# Patient Record
Sex: MALE | Race: WHITE | NOT HISPANIC OR LATINO | URBAN - METROPOLITAN AREA
[De-identification: names, ages, dates, MRNs, and addresses within clinical notes are randomized per-mention and may not be internally consistent; named-entity substitution may affect disease eponyms.]

---

## 2017-05-24 ENCOUNTER — INPATIENT (INPATIENT)
Facility: HOSPITAL | Age: 82
LOS: 1 days | Discharge: HOME | DRG: 287 | End: 2017-05-26
Attending: INTERNAL MEDICINE | Admitting: INTERNAL MEDICINE
Payer: MEDICARE

## 2017-05-24 VITALS
RESPIRATION RATE: 18 BRPM | HEIGHT: 65 IN | DIASTOLIC BLOOD PRESSURE: 65 MMHG | OXYGEN SATURATION: 95 % | HEART RATE: 62 BPM | WEIGHT: 169.98 LBS | TEMPERATURE: 98 F | SYSTOLIC BLOOD PRESSURE: 181 MMHG

## 2017-05-24 LAB
ALBUMIN SERPL ELPH-MCNC: 4 G/DL — SIGNIFICANT CHANGE UP (ref 3.3–5)
ALP SERPL-CCNC: 91 U/L — SIGNIFICANT CHANGE UP (ref 40–120)
ALT FLD-CCNC: 15 U/L — SIGNIFICANT CHANGE UP (ref 10–45)
ANION GAP SERPL CALC-SCNC: 13 MMOL/L — SIGNIFICANT CHANGE UP (ref 5–17)
APTT BLD: 28.6 SEC — SIGNIFICANT CHANGE UP (ref 27.5–37.4)
AST SERPL-CCNC: 20 U/L — SIGNIFICANT CHANGE UP (ref 10–40)
BASOPHILS NFR BLD AUTO: 0.3 % — SIGNIFICANT CHANGE UP (ref 0–2)
BILIRUB SERPL-MCNC: 0.4 MG/DL — SIGNIFICANT CHANGE UP (ref 0.2–1.2)
BUN SERPL-MCNC: 21 MG/DL — SIGNIFICANT CHANGE UP (ref 7–23)
CALCIUM SERPL-MCNC: 9.2 MG/DL — SIGNIFICANT CHANGE UP (ref 8.4–10.5)
CHLORIDE SERPL-SCNC: 105 MMOL/L — SIGNIFICANT CHANGE UP (ref 96–108)
CK MB CFR SERPL CALC: 3.4 NG/ML — SIGNIFICANT CHANGE UP (ref 0–6.7)
CK SERPL-CCNC: 92 U/L — SIGNIFICANT CHANGE UP (ref 30–200)
CO2 SERPL-SCNC: 23 MMOL/L — SIGNIFICANT CHANGE UP (ref 22–31)
CREAT SERPL-MCNC: 1.2 MG/DL — SIGNIFICANT CHANGE UP (ref 0.5–1.3)
EOSINOPHIL NFR BLD AUTO: 2.7 % — SIGNIFICANT CHANGE UP (ref 0–6)
GLUCOSE SERPL-MCNC: 115 MG/DL — HIGH (ref 70–99)
HCT VFR BLD CALC: 39.1 % — SIGNIFICANT CHANGE UP (ref 39–50)
HGB BLD-MCNC: 13.5 G/DL — SIGNIFICANT CHANGE UP (ref 13–17)
INR BLD: 1.1 — SIGNIFICANT CHANGE UP (ref 0.88–1.16)
LYMPHOCYTES # BLD AUTO: 18.1 % — SIGNIFICANT CHANGE UP (ref 13–44)
MCHC RBC-ENTMCNC: 29.9 PG — SIGNIFICANT CHANGE UP (ref 27–34)
MCHC RBC-ENTMCNC: 34.5 G/DL — SIGNIFICANT CHANGE UP (ref 32–36)
MCV RBC AUTO: 86.7 FL — SIGNIFICANT CHANGE UP (ref 80–100)
MONOCYTES NFR BLD AUTO: 7.7 % — SIGNIFICANT CHANGE UP (ref 2–14)
NEUTROPHILS NFR BLD AUTO: 71.2 % — SIGNIFICANT CHANGE UP (ref 43–77)
PLATELET # BLD AUTO: 246 K/UL — SIGNIFICANT CHANGE UP (ref 150–400)
POTASSIUM SERPL-MCNC: 4.5 MMOL/L — SIGNIFICANT CHANGE UP (ref 3.5–5.3)
POTASSIUM SERPL-SCNC: 4.5 MMOL/L — SIGNIFICANT CHANGE UP (ref 3.5–5.3)
PROT SERPL-MCNC: 6.8 G/DL — SIGNIFICANT CHANGE UP (ref 6–8.3)
PROTHROM AB SERPL-ACNC: 12.2 SEC — SIGNIFICANT CHANGE UP (ref 9.8–12.7)
RBC # BLD: 4.51 M/UL — SIGNIFICANT CHANGE UP (ref 4.2–5.8)
RBC # FLD: 12.9 % — SIGNIFICANT CHANGE UP (ref 10.3–16.9)
SODIUM SERPL-SCNC: 141 MMOL/L — SIGNIFICANT CHANGE UP (ref 135–145)
TROPONIN T SERPL-MCNC: <0.01 NG/ML — SIGNIFICANT CHANGE UP (ref 0–0.01)
WBC # BLD: 7.8 K/UL — SIGNIFICANT CHANGE UP (ref 3.8–10.5)
WBC # FLD AUTO: 7.8 K/UL — SIGNIFICANT CHANGE UP (ref 3.8–10.5)

## 2017-05-24 PROCEDURE — 71010: CPT | Mod: 26

## 2017-05-24 PROCEDURE — 93010 ELECTROCARDIOGRAM REPORT: CPT | Mod: 76

## 2017-05-24 PROCEDURE — 99285 EMERGENCY DEPT VISIT HI MDM: CPT | Mod: 25

## 2017-05-24 RX ORDER — ASPIRIN/CALCIUM CARB/MAGNESIUM 324 MG
81 TABLET ORAL DAILY
Qty: 0 | Refills: 0 | Status: DISCONTINUED | OUTPATIENT
Start: 2017-05-24 | End: 2017-05-26

## 2017-05-24 RX ORDER — ASPIRIN/CALCIUM CARB/MAGNESIUM 324 MG
325 TABLET ORAL ONCE
Qty: 0 | Refills: 0 | Status: COMPLETED | OUTPATIENT
Start: 2017-05-24 | End: 2017-05-24

## 2017-05-24 RX ORDER — LISINOPRIL 2.5 MG/1
40 TABLET ORAL DAILY
Qty: 0 | Refills: 0 | Status: DISCONTINUED | OUTPATIENT
Start: 2017-05-24 | End: 2017-05-25

## 2017-05-24 RX ORDER — CLOPIDOGREL BISULFATE 75 MG/1
75 TABLET, FILM COATED ORAL DAILY
Qty: 0 | Refills: 0 | Status: DISCONTINUED | OUTPATIENT
Start: 2017-05-24 | End: 2017-05-25

## 2017-05-24 RX ORDER — RANOLAZINE 500 MG/1
500 TABLET, FILM COATED, EXTENDED RELEASE ORAL
Qty: 0 | Refills: 0 | Status: DISCONTINUED | OUTPATIENT
Start: 2017-05-24 | End: 2017-05-26

## 2017-05-24 RX ORDER — AMLODIPINE BESYLATE 2.5 MG/1
10 TABLET ORAL DAILY
Qty: 0 | Refills: 0 | Status: DISCONTINUED | OUTPATIENT
Start: 2017-05-24 | End: 2017-05-25

## 2017-05-24 RX ORDER — ATENOLOL 25 MG/1
25 TABLET ORAL DAILY
Qty: 0 | Refills: 0 | Status: DISCONTINUED | OUTPATIENT
Start: 2017-05-24 | End: 2017-05-25

## 2017-05-24 RX ORDER — PANTOPRAZOLE SODIUM 20 MG/1
40 TABLET, DELAYED RELEASE ORAL
Qty: 0 | Refills: 0 | Status: DISCONTINUED | OUTPATIENT
Start: 2017-05-24 | End: 2017-05-26

## 2017-05-24 RX ORDER — ATORVASTATIN CALCIUM 80 MG/1
20 TABLET, FILM COATED ORAL AT BEDTIME
Qty: 0 | Refills: 0 | Status: DISCONTINUED | OUTPATIENT
Start: 2017-05-24 | End: 2017-05-26

## 2017-05-24 RX ADMIN — ATORVASTATIN CALCIUM 20 MILLIGRAM(S): 80 TABLET, FILM COATED ORAL at 22:41

## 2017-05-24 RX ADMIN — Medication 325 MILLIGRAM(S): at 18:23

## 2017-05-24 NOTE — H&P ADULT - HISTORY OF PRESENT ILLNESS
84 y/o male former smoker, PMHx HTN, HLD, prostate ca s/p radiation, CAD last PCI 2016 NARESH OM1 & PTCA midLAD, presents to [facility] ED referred from cardiologist's office for evaluation of epigastric discomfort. Pt states he has been experiencing epigastric burning discomfort unrelated to activity associated with lightheadedness. Pt was scheduled for a NST as outpatient but did not yet make appointment. Pt reports he had an in-office TTE today which he states was normal. In ED, Troponin negative x1, EKG SB 58 bpm no ischemic changes, CXR unremarkable. Pt now admitted for telemetry, serial CE, and plan for NST.

## 2017-05-24 NOTE — ED PROVIDER NOTE - MEDICAL DECISION MAKING DETAILS
85 yo male with CAD HTN HLD with 4-5 dday hx of fatigue and CP  inc RBB  no SARANYA  await trop  will cath  no active pain dw dr espinoza 83 yo male with CAD HTN HLD with 4-5 day hx of fatigue and CP  inc RBB  no SARANYA  await trop  will cath  no active pain dw dr espinoza admit for cath in am

## 2017-05-24 NOTE — PATIENT PROFILE ADULT. - VISION (WITH CORRECTIVE LENSES IF THE PATIENT USUALLY WEARS THEM):
R eye vision impairment/Partially impaired: cannot see medication labels or newsprint, but can see obstacles in path, and the surrounding layout; can count fingers at arm's length

## 2017-05-24 NOTE — ED ADULT NURSE NOTE - OBJECTIVE STATEMENT
85 y/o male w/ hx of 6 stents c/o 6/10 chest pain x2 days. Patient A+Ox3, ambulating well, skin intact. Patient reporting decrease in pain since arrival. Patient denies SOB, fevers, n/v/d.

## 2017-05-24 NOTE — ED PROVIDER NOTE - OBJECTIVE STATEMENT
85 yo male with hx of CAD HTN HLD  with 3-4 day hx of CP  mid sternal radiating to neck with inceased fatigue and dec exercise tolerance over the past 2 weekas  was seen in office by pmd at [facility]- referred to ED for further eval  no active CP or sob  no leg swelling is compliant with ASA and plavix

## 2017-05-24 NOTE — ED ADULT TRIAGE NOTE - CHIEF COMPLAINT QUOTE
Pt directed to ED from Cardiologist office CO CP 6/10 x2 days.  Pt states "I was with my doctor and he told me to come in."  Pt denies N/V/D, Dizziness, SOB, and Fevers

## 2017-05-25 DIAGNOSIS — R07.9 CHEST PAIN, UNSPECIFIED: ICD-10-CM

## 2017-05-25 DIAGNOSIS — I25.10 ATHEROSCLEROTIC HEART DISEASE OF NATIVE CORONARY ARTERY WITHOUT ANGINA PECTORIS: ICD-10-CM

## 2017-05-25 DIAGNOSIS — Z02.9 ENCOUNTER FOR ADMINISTRATIVE EXAMINATIONS, UNSPECIFIED: ICD-10-CM

## 2017-05-25 DIAGNOSIS — E78.5 HYPERLIPIDEMIA, UNSPECIFIED: ICD-10-CM

## 2017-05-25 DIAGNOSIS — I10 ESSENTIAL (PRIMARY) HYPERTENSION: ICD-10-CM

## 2017-05-25 DIAGNOSIS — Z29.9 ENCOUNTER FOR PROPHYLACTIC MEASURES, UNSPECIFIED: ICD-10-CM

## 2017-05-25 LAB
ANION GAP SERPL CALC-SCNC: 10 MMOL/L — SIGNIFICANT CHANGE UP (ref 5–17)
APTT BLD: 30.3 SEC — SIGNIFICANT CHANGE UP (ref 27.5–37.4)
BUN SERPL-MCNC: 18 MG/DL — SIGNIFICANT CHANGE UP (ref 7–23)
CALCIUM SERPL-MCNC: 9.7 MG/DL — SIGNIFICANT CHANGE UP (ref 8.4–10.5)
CHLORIDE SERPL-SCNC: 104 MMOL/L — SIGNIFICANT CHANGE UP (ref 96–108)
CHOLEST SERPL-MCNC: 115 MG/DL — SIGNIFICANT CHANGE UP (ref 10–199)
CK SERPL-CCNC: 111 U/L — SIGNIFICANT CHANGE UP (ref 30–200)
CO2 SERPL-SCNC: 25 MMOL/L — SIGNIFICANT CHANGE UP (ref 22–31)
CREAT SERPL-MCNC: 1.1 MG/DL — SIGNIFICANT CHANGE UP (ref 0.5–1.3)
GLUCOSE SERPL-MCNC: 91 MG/DL — SIGNIFICANT CHANGE UP (ref 70–99)
HBA1C BLD-MCNC: 5.2 % — SIGNIFICANT CHANGE UP (ref 4–5.6)
HBA1C BLD-MCNC: 5.5 % — SIGNIFICANT CHANGE UP (ref 4–5.6)
HCT VFR BLD CALC: 37.7 % — LOW (ref 39–50)
HDLC SERPL-MCNC: 44 MG/DL — SIGNIFICANT CHANGE UP (ref 40–125)
HGB BLD-MCNC: 13.2 G/DL — SIGNIFICANT CHANGE UP (ref 13–17)
INR BLD: 1.17 — HIGH (ref 0.88–1.16)
LIPID PNL WITH DIRECT LDL SERPL: 62 MG/DL — SIGNIFICANT CHANGE UP
MAGNESIUM SERPL-MCNC: 1.9 MG/DL — SIGNIFICANT CHANGE UP (ref 1.6–2.6)
MCHC RBC-ENTMCNC: 30.3 PG — SIGNIFICANT CHANGE UP (ref 27–34)
MCHC RBC-ENTMCNC: 35 G/DL — SIGNIFICANT CHANGE UP (ref 32–36)
MCV RBC AUTO: 86.5 FL — SIGNIFICANT CHANGE UP (ref 80–100)
PLATELET # BLD AUTO: 216 K/UL — SIGNIFICANT CHANGE UP (ref 150–400)
POTASSIUM SERPL-MCNC: 3.9 MMOL/L — SIGNIFICANT CHANGE UP (ref 3.5–5.3)
POTASSIUM SERPL-SCNC: 3.9 MMOL/L — SIGNIFICANT CHANGE UP (ref 3.5–5.3)
PROTHROM AB SERPL-ACNC: 13 SEC — HIGH (ref 9.8–12.7)
RBC # BLD: 4.36 M/UL — SIGNIFICANT CHANGE UP (ref 4.2–5.8)
RBC # FLD: 13.1 % — SIGNIFICANT CHANGE UP (ref 10.3–16.9)
SODIUM SERPL-SCNC: 139 MMOL/L — SIGNIFICANT CHANGE UP (ref 135–145)
T3FREE SERPL-MCNC: 2.6 PG/ML — SIGNIFICANT CHANGE UP (ref 1.71–3.71)
T4 FREE SERPL-MCNC: 1.09 NG/DL — SIGNIFICANT CHANGE UP (ref 0.7–1.48)
TOTAL CHOLESTEROL/HDL RATIO MEASUREMENT: 2.6 RATIO — LOW (ref 3.4–9.6)
TRIGL SERPL-MCNC: 45 MG/DL — SIGNIFICANT CHANGE UP (ref 10–149)
TROPONIN T SERPL-MCNC: <0.01 NG/ML — SIGNIFICANT CHANGE UP (ref 0–0.01)
TSH SERPL-MCNC: 0.2 UIU/ML — LOW (ref 0.35–4.94)
WBC # BLD: 7.1 K/UL — SIGNIFICANT CHANGE UP (ref 3.8–10.5)
WBC # FLD AUTO: 7.1 K/UL — SIGNIFICANT CHANGE UP (ref 3.8–10.5)

## 2017-05-25 PROCEDURE — 93572 IV DOP VEL&/PRESS C FLO EA: CPT | Mod: 26,LC

## 2017-05-25 PROCEDURE — 94010 BREATHING CAPACITY TEST: CPT | Mod: 26

## 2017-05-25 PROCEDURE — 93571 IV DOP VEL&/PRESS C FLO 1ST: CPT | Mod: 26,LD

## 2017-05-25 PROCEDURE — 93458 L HRT ARTERY/VENTRICLE ANGIO: CPT | Mod: 26

## 2017-05-25 RX ORDER — SODIUM CHLORIDE 9 MG/ML
1000 INJECTION INTRAMUSCULAR; INTRAVENOUS; SUBCUTANEOUS ONCE
Qty: 0 | Refills: 0 | Status: COMPLETED | OUTPATIENT
Start: 2017-05-25 | End: 2017-05-25

## 2017-05-25 RX ORDER — SODIUM CHLORIDE 9 MG/ML
500 INJECTION INTRAMUSCULAR; INTRAVENOUS; SUBCUTANEOUS
Qty: 0 | Refills: 0 | Status: DISCONTINUED | OUTPATIENT
Start: 2017-05-25 | End: 2017-05-26

## 2017-05-25 RX ORDER — MAGNESIUM OXIDE 400 MG ORAL TABLET 241.3 MG
400 TABLET ORAL ONCE
Qty: 0 | Refills: 0 | Status: COMPLETED | OUTPATIENT
Start: 2017-05-25 | End: 2017-05-25

## 2017-05-25 RX ORDER — ATROPINE SULFATE 0.1 MG/ML
1 SYRINGE (ML) INJECTION ONCE
Qty: 0 | Refills: 0 | Status: COMPLETED | OUTPATIENT
Start: 2017-05-25 | End: 2017-05-25

## 2017-05-25 RX ORDER — POTASSIUM CHLORIDE 20 MEQ
20 PACKET (EA) ORAL ONCE
Qty: 0 | Refills: 0 | Status: COMPLETED | OUTPATIENT
Start: 2017-05-25 | End: 2017-05-25

## 2017-05-25 RX ORDER — HEPARIN SODIUM 5000 [USP'U]/ML
5000 INJECTION INTRAVENOUS; SUBCUTANEOUS EVERY 8 HOURS
Qty: 0 | Refills: 0 | Status: DISCONTINUED | OUTPATIENT
Start: 2017-05-25 | End: 2017-05-26

## 2017-05-25 RX ORDER — AMLODIPINE BESYLATE 2.5 MG/1
10 TABLET ORAL DAILY
Qty: 0 | Refills: 0 | Status: DISCONTINUED | OUTPATIENT
Start: 2017-05-25 | End: 2017-05-25

## 2017-05-25 RX ORDER — AMLODIPINE BESYLATE 2.5 MG/1
10 TABLET ORAL ONCE
Qty: 0 | Refills: 0 | Status: COMPLETED | OUTPATIENT
Start: 2017-05-25 | End: 2017-05-25

## 2017-05-25 RX ORDER — NITROGLYCERIN 6.5 MG
0.4 CAPSULE, EXTENDED RELEASE ORAL
Qty: 0 | Refills: 0 | Status: DISCONTINUED | OUTPATIENT
Start: 2017-05-25 | End: 2017-05-25

## 2017-05-25 RX ADMIN — RANOLAZINE 500 MILLIGRAM(S): 500 TABLET, FILM COATED, EXTENDED RELEASE ORAL at 17:25

## 2017-05-25 RX ADMIN — Medication 0.4 MILLIGRAM(S): at 10:58

## 2017-05-25 RX ADMIN — PANTOPRAZOLE SODIUM 40 MILLIGRAM(S): 20 TABLET, DELAYED RELEASE ORAL at 05:20

## 2017-05-25 RX ADMIN — HEPARIN SODIUM 5000 UNIT(S): 5000 INJECTION INTRAVENOUS; SUBCUTANEOUS at 05:20

## 2017-05-25 RX ADMIN — Medication 20 MILLIEQUIVALENT(S): at 08:18

## 2017-05-25 RX ADMIN — Medication 81 MILLIGRAM(S): at 11:35

## 2017-05-25 RX ADMIN — AMLODIPINE BESYLATE 10 MILLIGRAM(S): 2.5 TABLET ORAL at 01:41

## 2017-05-25 RX ADMIN — CLOPIDOGREL BISULFATE 75 MILLIGRAM(S): 75 TABLET, FILM COATED ORAL at 11:35

## 2017-05-25 RX ADMIN — SODIUM CHLORIDE 75 MILLILITER(S): 9 INJECTION INTRAMUSCULAR; INTRAVENOUS; SUBCUTANEOUS at 15:44

## 2017-05-25 RX ADMIN — RANOLAZINE 500 MILLIGRAM(S): 500 TABLET, FILM COATED, EXTENDED RELEASE ORAL at 05:20

## 2017-05-25 RX ADMIN — LISINOPRIL 40 MILLIGRAM(S): 2.5 TABLET ORAL at 05:20

## 2017-05-25 RX ADMIN — SODIUM CHLORIDE 4000 MILLILITER(S): 9 INJECTION INTRAMUSCULAR; INTRAVENOUS; SUBCUTANEOUS at 11:37

## 2017-05-25 RX ADMIN — MAGNESIUM OXIDE 400 MG ORAL TABLET 400 MILLIGRAM(S): 241.3 TABLET ORAL at 08:18

## 2017-05-25 RX ADMIN — Medication 1 MILLIGRAM(S): at 13:59

## 2017-05-25 RX ADMIN — HEPARIN SODIUM 5000 UNIT(S): 5000 INJECTION INTRAVENOUS; SUBCUTANEOUS at 23:00

## 2017-05-25 NOTE — PROGRESS NOTE ADULT - ASSESSMENT
86 y/o male known history of CAD s/p PCI (2015), HTN  admitted for further evaluation of chest discomfort.

## 2017-05-25 NOTE — CONSULT NOTE ADULT - ASSESSMENT
83 y/o M with PMH of HTN/HLD, and CAD s/p PCI 2016 to LAD and OM who returns with anginal symptoms and was found on cath to have 50% restenosis of mLAD   - complete pre-op workup with PFTs, carotid US, and echo  - if patient remains asymptomatic may be discharged home to return for MIDCAB next week with Dr. Luo

## 2017-05-25 NOTE — PROVIDER CONTACT NOTE (CHANGE IN STATUS NOTIFICATION) - ASSESSMENT
Pt C/O CP 6/10. Patient given Nitro sublingual 0.04 mg. Shortly after nitro was given pt C/O dizziness and verbalized he feels like fainting. Bp decreased to 66 systolic and 44 diastolic with HR fluctuating between high 30s and mid 40s. Pt also became diaphoresis. Pt received atropine 1mg IV push and bolus 500 ml. EKG done at bedside, pt put on pacer pads and monitor. Blood glucose level 113. Pt bp increased to 125/60 and HR of 64 before leaving to cath lab.

## 2017-05-25 NOTE — PROGRESS NOTE ADULT - PROBLEM SELECTOR PLAN 3
- -180 ovenight- likely from discontinuation of Labetolol for presumed stress test this am  - Hypotensive post nitro SL today; now improved to 90's with 500 ml fluid bolus.  - Hold Lisinopril, Norvasc; restart If BP normalizes.

## 2017-05-25 NOTE — PROGRESS NOTE ADULT - SUBJECTIVE AND OBJECTIVE BOX
CARDIOLOGY NP PROGRESS NOTE    Subjective: Pt seen and examined at bedside.  Denies chest pain, dizziness, palpitations or SOB.    Overnight Events: No events    TELEMETRY:  EKG:      VITAL SIGNS:  T(C): 37.5, Max: 37.5 (05-25 @ 09:00)  HR: 72 (52 - 72)  BP: 178/77 (107/52 - 184/78)  RR: 20 (18 - 20)  SpO2: 98% (95% - 98%)  Wt(kg): --    I&O's Summary  I & Os for 24h ending 25 May 2017 07:00  =============================================  IN: 120 ml / OUT: 1380 ml / NET: -1260 ml    I & Os for current day (as of 25 May 2017 13:30)  =============================================  IN: 180 ml / OUT: 0 ml / NET: 180 ml        PHYSICAL EXAM:    General: A/ox 3, No acute Distress  Neck: Supple, NO JVD  Cardiac: S1 S2, No M/R/G  Pulmonary: CTAB, Breathing unlabored, No Rhonchi/Rales/Wheezing  Abdomen: Soft, Non -tender, +BS x 4 quads  Extremities: No Rashes, No edema  Neuro: A/o x 3, No focal deficits          LABS:                          13.2   7.1   )-----------( 216      ( 25 May 2017 06:52 )             37.7                              05-25    139  |  104  |  18  ----------------------------<  91  3.9   |  25  |  1.10    Ca    9.7      25 May 2017 06:52  Mg     1.9     05-25    TPro  6.8  /  Alb  4.0  /  TBili  0.4  /  DBili  x   /  AST  20  /  ALT  15  /  AlkPhos  91  05-24    LIVER FUNCTIONS - ( 24 May 2017 17:01 )  Alb: 4.0 g/dL / Pro: 6.8 g/dL / ALK PHOS: 91 U/L / ALT: 15 U/L / AST: 20 U/L / GGT: x                                 PT/INR - ( 25 May 2017 06:52 )   PT: 13.0 sec;   INR: 1.17          PTT - ( 25 May 2017 06:52 )  PTT:30.3 sec  CAPILLARY BLOOD GLUCOSE    CARDIAC MARKERS ( 25 May 2017 06:52 )  x     / <0.01 ng/mL / 111 U/L / x     / x      CARDIAC MARKERS ( 24 May 2017 17:01 )  x     / <0.01 ng/mL / 92 U/L / x     / 3.4 ng/mL            No Known Allergies    MEDICATIONS  (STANDING):  aspirin enteric coated 81milliGRAM(s) Oral daily  lisinopril 40milliGRAM(s) Oral daily  ranolazine 500milliGRAM(s) Oral two times a day  atorvastatin 20milliGRAM(s) Oral at bedtime  clopidogrel Tablet 75milliGRAM(s) Oral daily  pantoprazole    Tablet 40milliGRAM(s) Oral before breakfast  amLODIPine   Tablet 10milliGRAM(s) Oral daily  heparin  Injectable 5000Unit(s) SubCutaneous every 8 hours  atropine Injectable 1milliGRAM(s) IV Push once    MEDICATIONS  (PRN):  nitroglycerin     SubLingual 0.4milliGRAM(s) SubLingual every 5 minutes PRN Chest Pain        DIAGNOSTIC TESTS:

## 2017-05-25 NOTE — CONSULT NOTE ADULT - SUBJECTIVE AND OBJECTIVE BOX
Surgeon: Dr. Luo    Requesting Physician: Dr. Yin    HISTORY OF PRESENT ILLNESS:    85 y/o male former smoker, PMHx HTN, HLD, prostate ca s/p radiation, CAD last PCI 2016 NARESH OM1 & PTCA midLAD, presented to [facility] ED referred from cardiologist's office for evaluation of epigastric discomfort.Troponin negative x2, EKG SB 58 bpm no ischemic changes, CXR unremarkable. Pt was admitted on 5/24 for telemetry, serial CE, and plan for NST but developed resting chest pain this morning for which he was given a sublingual nitro which precipitated an episode of hypotension (SBP in 60's), and bradycardia to 40' s for which eh was given atropine and fluid bolus with appropriate response.  He was taken for diagnostic cath this afternoon which revealed 70% mLAD desion, with 50% ISR of OM1.        PAST MEDICAL & SURGICAL HISTORY:  Stented coronary artery  Myocardial infarction  HLD (hyperlipidemia)  HTN (hypertension)  Other acquired absence of organ: S/P cholecystectomy  Other postprocedural status: S/P hernia repair      MEDICATIONS  (STANDING):  aspirin enteric coated 81milliGRAM(s) Oral daily  ranolazine 500milliGRAM(s) Oral two times a day  atorvastatin 20milliGRAM(s) Oral at bedtime  pantoprazole    Tablet 40milliGRAM(s) Oral before breakfast  heparin  Injectable 5000Unit(s) SubCutaneous every 8 hours  sodium chloride 0.9%. 500milliLiter(s) IV Continuous <Continuous>    MEDICATIONS  (PRN):  nitroglycerin     SubLingual 0.4milliGRAM(s) SubLingual every 5 minutes PRN Chest Pain      Allergies    No Known Allergies    Intolerances    SOCIAL HISTORY:  Smoker:  YES / NO        PACK YEARS:                         WHEN QUIT?  ETOH use:  YES / NO               FREQUENCY / QUANTITY:  Ilicit Drug use:  YES / NO  Occupation:  Assisted device use (Cane / Walker):  Live with:    FAMILY HISTORY:  Family history of MI (myocardial infarction) (Mother)  No pertinent family history: No significant family history      Review of Systems  CONSTITUTIONAL:  NEGATIVE  NEURO:  dizziness  EYES: NEGATIVE  ENMT: NEGATIVE  CV:  Chest/epigastric pain (see HPI)  RESPIRATORY:  NEGATIVE  GI:  NEGATIVE  : NEGATIVE  MUSKULOSKELETAL:  NEGATIVE  SKIN/BREAST: NEGATIVE  PSYCH: NEGATIVE  HEME/LYMPH: NEGATIVE  ENDOCRINE: NEGATIVE    PHYSICAL EXAM  Vital Signs Last 24 Hrs  T(C): 37.5, Max: 37.5 (05-25 @ 09:00)  T(F): 99.5, Max: 99.5 (05-25 @ 09:00)  HR: 72 (52 - 72)  BP: 178/77 (107/52 - 184/78)  BP(mean): 111 (109 - 129)  RR: 20 (18 - 20)  SpO2: 98% (95% - 98%)    CONSTITUTIONAL:                                                                          WNL  NEURO:                                                                                             WNL                      EYES:                                                                                                  WNL  ENMT:                                                                                                WNL  CV:                                                                                                      WNL  RESPIRATORY:                                                                                  WNL  GI:                                                                                                       WNL  : [place] + / -                                                                                 WNL  MUSKULOSKELETAL:                                                                       WNL  SKIN / BREAST:                                                                                 WNL                                                          LABS:                        13.2   7.1   )-----------( 216      ( 25 May 2017 06:52 )             37.7     05-25    139  |  104  |  18  ----------------------------<  91  3.9   |  25  |  1.10    Ca    9.7      25 May 2017 06:52  Mg     1.9     05-25    TPro  6.8  /  Alb  4.0  /  TBili  0.4  /  DBili  x   /  AST  20  /  ALT  15  /  AlkPhos  91  05-24    PT/INR - ( 25 May 2017 06:52 )   PT: 13.0 sec;   INR: 1.17          PTT - ( 25 May 2017 06:52 )  PTT:30.3 sec    CARDIAC MARKERS ( 25 May 2017 06:52 )  x     / <0.01 ng/mL / 111 U/L / x     / x      CARDIAC MARKERS ( 24 May 2017 17:01 )  x     / <0.01 ng/mL / 92 U/L / x     / 3.4 ng/mL          RADIOLOGY & ADDITIONAL STUDIES:  CAROTID U/S:    CXR:    CT Scan:    EKG:    TTE / LAVELLE:    Cardiac Cath: Surgeon: Dr. Luo    Requesting Physician: Dr. Yin    HISTORY OF PRESENT ILLNESS:    85 y/o male former smoker, PMHx HTN, HLD, prostate ca s/p radiation, CAD last PCI 2016 NARESH OM1 & PTCA midLAD, presented to [facility] ED referred from cardiologist's office for evaluation of epigastric discomfort.Troponin negative x2, EKG SB 58 bpm no ischemic changes, CXR unremarkable. Pt was admitted on 5/24 for telemetry, serial CE, and plan for NST but developed resting chest pain this morning for which he was given a sublingual nitro which precipitated an episode of hypotension (SBP in 60's), and bradycardia to 40' s for which eh was given atropine and fluid bolus with appropriate response.  He was taken for diagnostic cath this afternoon which revealed 70% mLAD desion, with 50% ISR of OM1.  Since this morning he has remained chest pain free and asymptomatic.  He reports he used to be able to ambulate for 3-40 minutes without difficulty, but recently has been limited to less than 20 minutes due to anginal symptoms.       PAST MEDICAL & SURGICAL HISTORY:  Stented coronary artery  Myocardial infarction  HLD (hyperlipidemia)  HTN (hypertension)  Other acquired absence of organ: S/P cholecystectomy  Other postprocedural status: S/P hernia repair      MEDICATIONS  (STANDING):  aspirin enteric coated 81milliGRAM(s) Oral daily  ranolazine 500milliGRAM(s) Oral two times a day  atorvastatin 20milliGRAM(s) Oral at bedtime  pantoprazole    Tablet 40milliGRAM(s) Oral before breakfast  heparin  Injectable 5000Unit(s) SubCutaneous every 8 hours  sodium chloride 0.9%. 500milliLiter(s) IV Continuous <Continuous>    MEDICATIONS  (PRN):  nitroglycerin     SubLingual 0.4milliGRAM(s) SubLingual every 5 minutes PRN Chest Pain      Allergies    No Known Allergies    Intolerances    SOCIAL HISTORY:  Smoker:  YES, <1/2 ppd off and on from the age of 8, quit 20 years ago  ETOH use:  YES              FREQUENCY / QUANTITY: infrequently, 5 drinks/month  Ilicit Drug use:  NO  Occupation: retired  Assisted device use (Cane / Walker): was told to use a cane, but rarely does  Live with: wife     FAMILY HISTORY:  Family history of MI (myocardial infarction) (Mother)  No pertinent family history: No significant family history      Review of Systems  CONSTITUTIONAL:  NEGATIVE  NEURO:  dizziness  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  ENMT: [place]  CV:  Chest/epigastric pain (see HPI)  RESPIRATORY:  NEGATIVE  GI:  NEGATIVE  : NEGATIVE  MUSKULOSKELETAL:  NEGATIVE  SKIN/BREAST: NEGATIVE  PSYCH: NEGATIVE  HEME/LYMPH: NEGATIVE  ENDOCRINE: NEGATIVE    PHYSICAL EXAM  Vital Signs Last 24 Hrs  T(C): 37.5, Max: 37.5 (05-25 @ 09:00)  T(F): 99.5, Max: 99.5 (05-25 @ 09:00)  HR: 72 (52 - 72)  BP: 178/77 (107/52 - 184/78)  BP(mean): 111 (109 - 129)  RR: 20 (18 - 20)  SpO2: 98% (95% - 98%)    CONSTITUTIONAL: Wn/WD, NAD  NEURO:  A&Ox3                      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, EOMI  ENMT:  moist mucous membranes  CV: S1S2 RRR  RESPIRATORY:CTA b/l no W/R/R  GI:soft NT/ND +BS  : no mcneal    MUSKULOSKELETAL:  normal gait, no kyphosis/scoliosis  SKIN / BREAST:  no lesions/masses                                                          LABS:                        13.2   7.1   )-----------( 216      ( 25 May 2017 06:52 )             37.7     05-25    139  |  104  |  18  ----------------------------<  91  3.9   |  25  |  1.10    Ca    9.7      25 May 2017 06:52  Mg     1.9     05-25    TPro  6.8  /  Alb  4.0  /  TBili  0.4  /  DBili  x   /  AST  20  /  ALT  15  /  AlkPhos  91  05-24    PT/INR - ( 25 May 2017 06:52 )   PT: 13.0 sec;   INR: 1.17          PTT - ( 25 May 2017 06:52 )  PTT:30.3 sec    CARDIAC MARKERS ( 25 May 2017 06:52 )  x     / <0.01 ng/mL / 111 U/L / x     / x      CARDIAC MARKERS ( 24 May 2017 17:01 )  x     / <0.01 ng/mL / 92 U/L / x     / 3.4 ng/mL          RADIOLOGY & ADDITIONAL STUDIES:    CAROTID U/S:pending    CXR: no infiltrates/effusions/ PVC    EKG: no ischemic changes    TTE / LAVELLE: pending    Cardiac Cath: 50% mLAD restenosis

## 2017-05-25 NOTE — CHART NOTE - NSCHARTNOTEFT_GEN_A_CORE
PGY-3 Event Note    Called by RN. Pt was seen and examined at bedside immediately. Pt awake and alert, however, lethargic. On telemetry, pt noted to be bradycardic 40-50 and hypotensive with SBP 60s. Pt had previously complained of chest pressure to NP and received Nitroglycerin SL 0.4 mg prior to episode. Pt was given Atropine 1 mg and  cc bolus, with improvement in BP, HR, and mental status. After pt received medication, pt states that he previously felt that he was going to faint prior to episode, however, states CP now resolved. Physical exam unchanged from prior. Attending, Dr. Yin, at bedside, requested for urgent bedside ECHO, plan for cardiac angiogram today.

## 2017-05-26 ENCOUNTER — TRANSCRIPTION ENCOUNTER (OUTPATIENT)
Age: 82
End: 2017-05-26

## 2017-05-26 VITALS
DIASTOLIC BLOOD PRESSURE: 75 MMHG | SYSTOLIC BLOOD PRESSURE: 161 MMHG | OXYGEN SATURATION: 96 % | RESPIRATION RATE: 14 BRPM | HEART RATE: 56 BPM

## 2017-05-26 LAB
ANION GAP SERPL CALC-SCNC: 11 MMOL/L — SIGNIFICANT CHANGE UP (ref 5–17)
BUN SERPL-MCNC: 16 MG/DL — SIGNIFICANT CHANGE UP (ref 7–23)
CALCIUM SERPL-MCNC: 9.1 MG/DL — SIGNIFICANT CHANGE UP (ref 8.4–10.5)
CHLORIDE SERPL-SCNC: 102 MMOL/L — SIGNIFICANT CHANGE UP (ref 96–108)
CO2 SERPL-SCNC: 25 MMOL/L — SIGNIFICANT CHANGE UP (ref 22–31)
CREAT SERPL-MCNC: 1.2 MG/DL — SIGNIFICANT CHANGE UP (ref 0.5–1.3)
GLUCOSE SERPL-MCNC: 94 MG/DL — SIGNIFICANT CHANGE UP (ref 70–99)
HCT VFR BLD CALC: 37.8 % — LOW (ref 39–50)
HGB BLD-MCNC: 13.3 G/DL — SIGNIFICANT CHANGE UP (ref 13–17)
MAGNESIUM SERPL-MCNC: 1.9 MG/DL — SIGNIFICANT CHANGE UP (ref 1.6–2.6)
MCHC RBC-ENTMCNC: 30.5 PG — SIGNIFICANT CHANGE UP (ref 27–34)
MCHC RBC-ENTMCNC: 35.2 G/DL — SIGNIFICANT CHANGE UP (ref 32–36)
MCV RBC AUTO: 86.7 FL — SIGNIFICANT CHANGE UP (ref 80–100)
PLATELET # BLD AUTO: 205 K/UL — SIGNIFICANT CHANGE UP (ref 150–400)
POTASSIUM SERPL-MCNC: 4.8 MMOL/L — SIGNIFICANT CHANGE UP (ref 3.5–5.3)
POTASSIUM SERPL-SCNC: 4.8 MMOL/L — SIGNIFICANT CHANGE UP (ref 3.5–5.3)
RBC # BLD: 4.36 M/UL — SIGNIFICANT CHANGE UP (ref 4.2–5.8)
RBC # FLD: 13.2 % — SIGNIFICANT CHANGE UP (ref 10.3–16.9)
SODIUM SERPL-SCNC: 138 MMOL/L — SIGNIFICANT CHANGE UP (ref 135–145)
WBC # BLD: 7.8 K/UL — SIGNIFICANT CHANGE UP (ref 3.8–10.5)
WBC # FLD AUTO: 7.8 K/UL — SIGNIFICANT CHANGE UP (ref 3.8–10.5)

## 2017-05-26 PROCEDURE — 93005 ELECTROCARDIOGRAM TRACING: CPT

## 2017-05-26 PROCEDURE — 84439 ASSAY OF FREE THYROXINE: CPT

## 2017-05-26 PROCEDURE — 84484 ASSAY OF TROPONIN QUANT: CPT

## 2017-05-26 PROCEDURE — 82550 ASSAY OF CK (CPK): CPT

## 2017-05-26 PROCEDURE — C1760: CPT

## 2017-05-26 PROCEDURE — 80061 LIPID PANEL: CPT

## 2017-05-26 PROCEDURE — 85025 COMPLETE CBC W/AUTO DIFF WBC: CPT

## 2017-05-26 PROCEDURE — 93306 TTE W/DOPPLER COMPLETE: CPT

## 2017-05-26 PROCEDURE — C1769: CPT

## 2017-05-26 PROCEDURE — C1894: CPT

## 2017-05-26 PROCEDURE — 85027 COMPLETE CBC AUTOMATED: CPT

## 2017-05-26 PROCEDURE — 80053 COMPREHEN METABOLIC PANEL: CPT

## 2017-05-26 PROCEDURE — 36415 COLL VENOUS BLD VENIPUNCTURE: CPT

## 2017-05-26 PROCEDURE — 94150 VITAL CAPACITY TEST: CPT

## 2017-05-26 PROCEDURE — C1887: CPT

## 2017-05-26 PROCEDURE — 84443 ASSAY THYROID STIM HORMONE: CPT

## 2017-05-26 PROCEDURE — 93880 EXTRACRANIAL BILAT STUDY: CPT

## 2017-05-26 PROCEDURE — 71045 X-RAY EXAM CHEST 1 VIEW: CPT

## 2017-05-26 PROCEDURE — 84481 FREE ASSAY (FT-3): CPT

## 2017-05-26 PROCEDURE — 83036 HEMOGLOBIN GLYCOSYLATED A1C: CPT

## 2017-05-26 PROCEDURE — C1889: CPT

## 2017-05-26 PROCEDURE — 93306 TTE W/DOPPLER COMPLETE: CPT | Mod: 26

## 2017-05-26 PROCEDURE — 93880 EXTRACRANIAL BILAT STUDY: CPT | Mod: 26

## 2017-05-26 PROCEDURE — 82553 CREATINE MB FRACTION: CPT

## 2017-05-26 PROCEDURE — 99285 EMERGENCY DEPT VISIT HI MDM: CPT | Mod: 25

## 2017-05-26 PROCEDURE — 83735 ASSAY OF MAGNESIUM: CPT

## 2017-05-26 PROCEDURE — 85730 THROMBOPLASTIN TIME PARTIAL: CPT

## 2017-05-26 PROCEDURE — 80048 BASIC METABOLIC PNL TOTAL CA: CPT

## 2017-05-26 PROCEDURE — 85610 PROTHROMBIN TIME: CPT

## 2017-05-26 RX ORDER — ATENOLOL 25 MG/1
25 TABLET ORAL DAILY
Qty: 0 | Refills: 0 | Status: DISCONTINUED | OUTPATIENT
Start: 2017-05-26 | End: 2017-05-26

## 2017-05-26 RX ORDER — ATORVASTATIN CALCIUM 20 MG/1
20 TABLET, FILM COATED ORAL
Qty: 30 | Refills: 3 | Status: ACTIVE | COMMUNITY

## 2017-05-26 RX ORDER — PANTOPRAZOLE 20 MG/1
20 TABLET, DELAYED RELEASE ORAL DAILY
Qty: 30 | Refills: 0 | Status: ACTIVE | COMMUNITY

## 2017-05-26 RX ORDER — AMLODIPINE BESYLATE 2.5 MG/1
10 TABLET ORAL DAILY
Qty: 0 | Refills: 0 | Status: DISCONTINUED | OUTPATIENT
Start: 2017-05-26 | End: 2017-05-26

## 2017-05-26 RX ORDER — CLOPIDOGREL 75 MG/1
75 TABLET, FILM COATED ORAL DAILY
Refills: 0 | Status: ACTIVE | COMMUNITY

## 2017-05-26 RX ORDER — ASPIRIN 81 MG
81 TABLET, DELAYED RELEASE (ENTERIC COATED) ORAL
Refills: 0 | Status: ACTIVE | COMMUNITY

## 2017-05-26 RX ADMIN — PANTOPRAZOLE SODIUM 40 MILLIGRAM(S): 20 TABLET, DELAYED RELEASE ORAL at 05:57

## 2017-05-26 RX ADMIN — Medication 81 MILLIGRAM(S): at 10:49

## 2017-05-26 RX ADMIN — AMLODIPINE BESYLATE 10 MILLIGRAM(S): 2.5 TABLET ORAL at 10:49

## 2017-05-26 RX ADMIN — HEPARIN SODIUM 5000 UNIT(S): 5000 INJECTION INTRAVENOUS; SUBCUTANEOUS at 05:57

## 2017-05-26 RX ADMIN — ATORVASTATIN CALCIUM 20 MILLIGRAM(S): 80 TABLET, FILM COATED ORAL at 00:14

## 2017-05-26 RX ADMIN — RANOLAZINE 500 MILLIGRAM(S): 500 TABLET, FILM COATED, EXTENDED RELEASE ORAL at 05:57

## 2017-05-26 NOTE — DISCHARGE NOTE ADULT - CARE PROVIDER_API CALL
Jez Luo (MD), Cardiovascular Surgery  [address] [place] [address]  Phone: [phone]  Fax: [phone]

## 2017-05-26 NOTE — DISCHARGE NOTE ADULT - HOSPITAL COURSE
84 y/o male former smoker, PMHx HTN, HLD, prostate ca s/p radiation, CAD last PCI 2016 NARESH OM1 & PTCA midLAD, presents to [facility] ED referred from cardiologist's office for evaluation of epigastric discomfort. Pt states he has been experiencing epigastric burning discomfort unrelated to activity associated with lightheadedness. Pt was scheduled for a NST as outpatient but did not yet make appointment. Pt reports he had an in-office TTE today which he states was normal. In ED, Troponin negative x1, EKG SB 58 bpm no ischemic changes, CXR unremarkable. Patient was admitted and ruled out for ACS (trops negative x 2). During his stay, patient was given nitro SL x 1 for chest pain after which he became significantly hypotensive, bradycardic with -->66, HR 45 and very symptomatic, clammy.  Patient was given 1 mg atropine and 500 ml bolus with resolution of symptoms. In light of his cardiac history, patient  underwent cath which showed: LM mild luminal irregularities, 70% diffuse mLAD ISR and prox LAD (s/p FFR post adenosine: 0.81). OM1 50% ISR (co dominant, s/p FFR post Adenosine 0.95. pRCA 40% IRS (co-dminant). LVEF 50%, LVEDP 4 mmHG. No AS. No MR. No intervention was performed and CTS (DR Luo) as consulted for MID-CAB. Patient was seen by CTS and underwent preop screening including Echo, Carotid Duplex and PFT's and was discharged home with instructions to follow up with Dr. Luo the following week. 84 y/o male former smoker, PMHx HTN, HLD, prostate ca s/p radiation, CAD last PCI 2016 NARESH OM1 & PTCA midLAD, presents to [facility] ED referred from cardiologist's office for evaluation of epigastric discomfort. Pt states he has been experiencing epigastric burning discomfort unrelated to activity associated with lightheadedness. Pt was scheduled for a NST as outpatient but did not yet make appointment. Pt reports he had an in-office TTE today which he states was normal. In ED, Troponin negative x1, EKG SB 58 bpm no ischemic changes, CXR unremarkable. Patient was admitted and ruled out for ACS (trops negative x 2). During his stay, patient was given nitro SL x 1 for chest pain after which he became significantly hypotensive, bradycardic with -->66, HR 45 and very symptomatic, clammy.  Patient was given 1 mg atropine and 500 ml bolus with resolution of symptoms. In light of his cardiac history, patient  underwent cath which showed: LM mild luminal irregularities, 70% diffuse mLAD ISR and prox LAD (s/p FFR post adenosine: 0.81). OM1 50% ISR (co dominant, s/p FFR post Adenosine 0.95. pRCA 40% IRS (co-dminant). LVEF 50%, LVEDP 4 mmHG. No AS. No MR. No intervention was performed and CTS (DR Luo) as consulted for MID-CAB. Patient was seen by CTS and underwent preop screening including Echo, Carotid Duplex and PFT's and was discharged home with instructions to return on Wednesday, may 31st for MID-CAB with Dr. Luo.  All instructions were given to patient by Dr. Luo's NP. Plavix and ACE-I to be held for surgery per CTS recs.

## 2017-05-26 NOTE — DISCHARGE NOTE ADULT - MEDICATION SUMMARY - MEDICATIONS TO TAKE
I will START or STAY ON the medications listed below when I get home from the hospital:    Aspirin Enteric Coated 81 mg oral delayed release tablet  -- 1 tab(s) by mouth once a day  -- Indication: For CAD (coronary artery disease)    Ranexa 500 mg oral tablet, extended release  -- 1 tab(s) by mouth 2 times a day  -- Indication: For CAD (coronary artery disease)    Lipitor 20 mg oral tablet  -- 1 tab(s) by mouth once a day (at bedtime)  -- Indication: For CAD (coronary artery disease)    atenolol 25 mg oral tablet  -- 1 tab(s) by mouth once a day  -- Indication: For CAD (coronary artery disease)    Norvasc 10 mg oral tablet  -- 1 tab(s) by mouth once a day  -- Indication: For Hypertension    pantoprazole 20 mg oral delayed release tablet  -- 1 tab(s) by mouth once a day  -- Indication: For Stomach protection/ GERD

## 2017-05-26 NOTE — DISCHARGE NOTE ADULT - ADDITIONAL INSTRUCTIONS
1.  Please follow up with Dr. Luo   [address]  [phone] 1.  Please report to the hospital on Wednesday, May 31st for your surgery with Dr. Luo.  Follow all instructions given to you by the Cardiothoracic team.  [address] [place] [address]  [phone].     2.  Please followup with your Cardiologist after your surgery.  Dr. Franck Wells: [phone]

## 2017-05-26 NOTE — DISCHARGE NOTE ADULT - MEDICATION SUMMARY - MEDICATIONS TO STOP TAKING
I will STOP taking the medications listed below when I get home from the hospital:    Plavix 75 mg oral tablet  -- 1 tab(s) by mouth once a day    benazepril 40 mg oral tablet  -- 1 tab(s) by mouth once a day

## 2017-05-26 NOTE — DISCHARGE NOTE ADULT - PLAN OF CARE
You were admitted to the cardiac telemetry unit for evaluation of your chest pain. You blood work and EKG showed that you DID NOT have a heart attack.  However because of your significant cardiac history, you underwent coronary angiogram which showed that you have significant blockages in the left anterior descending artery (LAD).  You were evaluated by Dr. Luo from Cardiothoracic surgery and will undergo Minimally invasive coronary artery bypass surgery (date to be determined once you follow up with Dr. Luo).  Due to the procedure, please avoid any strenuous activity for at least one week.  Your plavix has been discontinued in preparation for your surgery. You were given the medication NITROGLYCERIN for complaints of chest pain during your hospitalization.  However after receiving this medications, your blood pressure dropped significantly as well as your heart rate and you were given intravenous fluids and the medication Atropine.  Please notify your Doctors and other health care providers of this reaction. Remain pain free. You were admitted to the cardiac telemetry unit for evaluation of your chest pain. You blood work and EKG showed that you DID NOT have a heart attack.  However because of your significant cardiac history, you underwent coronary angiogram which showed that you have significant blockages for which you were evaluated by Dr. Luo from Cardiothoracic surgery and will undergo Minimally invasive coronary artery bypass surgery on Wednesday, May 31st.  Please follow all the instructions given to you by the cardiothoracic team. Due to the procedure, please avoid any strenuous activity for at least one week.  Your Plavix and Benazapril has been discontinued in preparation for your surgery.

## 2017-05-26 NOTE — DISCHARGE NOTE ADULT - CARE PLAN
Principal Discharge DX:	Chest pain  Goal:	Remain pain free.  Instructions for follow-up, activity and diet:	You were admitted to the cardiac telemetry unit for evaluation of your chest pain. You blood work and EKG showed that you DID NOT have a heart attack.  However because of your significant cardiac history, you underwent coronary angiogram which showed that you have significant blockages in the left anterior descending artery (LAD).  You were evaluated by Dr. Luo from Cardiothoracic surgery and will undergo Minimally invasive coronary artery bypass surgery (date to be determined once you follow up with Dr. Luo).  Due to the procedure, please avoid any strenuous activity for at least one week.  Your plavix has been discontinued in preparation for your surgery.  Secondary Diagnosis:	Medication adverse effect  Instructions for follow-up, activity and diet:	You were given the medication NITROGLYCERIN for complaints of chest pain during your hospitalization.  However after receiving this medications, your blood pressure dropped significantly as well as your heart rate and you were given intravenous fluids and the medication Atropine.  Please notify your Doctors and other health care providers of this reaction. Principal Discharge DX:	Chest pain  Goal:	Remain pain free.  Instructions for follow-up, activity and diet:	You were admitted to the cardiac telemetry unit for evaluation of your chest pain. You blood work and EKG showed that you DID NOT have a heart attack.  However because of your significant cardiac history, you underwent coronary angiogram which showed that you have significant blockages for which you were evaluated by Dr. Luo from Cardiothoracic surgery and will undergo Minimally invasive coronary artery bypass surgery on Wednesday, May 31st.  Please follow all the instructions given to you by the cardiothoracic team. Due to the procedure, please avoid any strenuous activity for at least one week.  Your Plavix and Benazapril has been discontinued in preparation for your surgery.  Secondary Diagnosis:	Medication adverse effect  Instructions for follow-up, activity and diet:	You were given the medication NITROGLYCERIN for complaints of chest pain during your hospitalization.  However after receiving this medications, your blood pressure dropped significantly as well as your heart rate and you were given intravenous fluids and the medication Atropine.  Please notify your Doctors and other health care providers of this reaction.

## 2017-05-30 VITALS
RESPIRATION RATE: 16 BRPM | DIASTOLIC BLOOD PRESSURE: 65 MMHG | OXYGEN SATURATION: 98 % | WEIGHT: 171.96 LBS | SYSTOLIC BLOOD PRESSURE: 140 MMHG | HEIGHT: 64.96 IN | HEART RATE: 58 BPM

## 2017-05-30 DIAGNOSIS — I25.10 ATHEROSCLEROTIC HEART DISEASE OF NATIVE CORONARY ARTERY WITHOUT ANGINA PECTORIS: ICD-10-CM
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
## 2017-05-30 DIAGNOSIS — Z92.3 PERSONAL HISTORY OF IRRADIATION: ICD-10-CM

## 2017-05-30 DIAGNOSIS — I95.9 HYPOTENSION, UNSPECIFIED: ICD-10-CM

## 2017-05-30 DIAGNOSIS — I10 ESSENTIAL (PRIMARY) HYPERTENSION: ICD-10-CM

## 2017-05-30 DIAGNOSIS — T46.3X5A ADVERSE EFFECT OF CORONARY VASODILATORS, INITIAL ENCOUNTER: ICD-10-CM

## 2017-05-30 DIAGNOSIS — R00.1 BRADYCARDIA, UNSPECIFIED: ICD-10-CM

## 2017-05-30 DIAGNOSIS — Z82.49 FAMILY HISTORY OF ISCHEMIC HEART DISEASE AND OTHER DISEASES OF THE CIRCULATORY SYSTEM: ICD-10-CM

## 2017-05-30 DIAGNOSIS — I25.2 OLD MYOCARDIAL INFARCTION: ICD-10-CM

## 2017-05-30 DIAGNOSIS — Z85.46 PERSONAL HISTORY OF MALIGNANT NEOPLASM OF PROSTATE: ICD-10-CM

## 2017-05-30 DIAGNOSIS — Z87.891 PERSONAL HISTORY OF NICOTINE DEPENDENCE: ICD-10-CM

## 2017-05-30 DIAGNOSIS — Z79.02 LONG TERM (CURRENT) USE OF ANTITHROMBOTICS/ANTIPLATELETS: ICD-10-CM

## 2017-05-30 DIAGNOSIS — Z95.5 PRESENCE OF CORONARY ANGIOPLASTY IMPLANT AND GRAFT: ICD-10-CM

## 2017-05-30 DIAGNOSIS — Z79.82 LONG TERM (CURRENT) USE OF ASPIRIN: ICD-10-CM

## 2017-05-30 DIAGNOSIS — E78.5 HYPERLIPIDEMIA, UNSPECIFIED: ICD-10-CM

## 2017-05-30 NOTE — H&P ADULT - FAMILY HISTORY
No pertinent family history, No significant family history     Mother  Still living? Unknown  Family history of MI (myocardial infarction), Age at diagnosis: Age Unknown

## 2017-05-30 NOTE — H&P ADULT - NSHPSOCIALHISTORY_GEN_ALL_CORE
Smoker:  YES, <1/2 ppd off and on from the age of 8, quit 20 years ago  ETOH use:  YES              FREQUENCY / QUANTITY: infrequently, 5 drinks/month  Ilicit Drug use:  NO  Occupation: retired  Assisted device use (Cane / Walker): was told to use a cane, but rarely does  Live with: wife in private home

## 2017-05-30 NOTE — H&P ADULT - PSH
Other acquired absence of organ  S/P cholecystectomy  Other postprocedural status  S/P hernia repair

## 2017-05-30 NOTE — H&P ADULT - HISTORY OF PRESENT ILLNESS
85 y/o male, former smoker, PMHx HTN, HLD, prostate ca s/p radiation, CAD last PCI 2016 NARESH OM1 & PTCA midLAD, presented to [facility] ED on 5/24/17, referred from cardiologist's office for evaluation of epigastric discomfort.Troponin negative x2, EKG SB 58 bpm no ischemic changes, CXR unremarkable. Pt was admitted to L telemetry, serial CE, and plan for NST but developed resting chest pain for which he was given a sublingual nitro which precipitated an episode of hypotension (SBP in 60's), and bradycardia to 40' s for which he was given atropine and fluid bolus with appropriate response.  He was taken for diagnostic cath which revealed 70% mLAD desion, with 50% ISR of OM1. Dr. Luo was consulted for MIDCAB.  The patient now presents for elective surgery.

## 2017-05-30 NOTE — H&P ADULT - ASSESSMENT
83 yo male with a history of HTN, HLD and CAD s/p multiple PCI's last in 2016 presents with class 2-3 angina. Cardiac cath revealed ISR of proximal and MID LAD. Dr. Luo reviewed the cardiac cath imaging and reports with the patient and his wife and discussed the case with Dr. Yin.  Dr. Luo discussed the risks, benefits and alternatives to surgery. Risks include but not limited to death, heart attack, bleeding, stroke, kidney problems and infection. He quoted a 1% operative mortality and complication risk.  He also discussed the various approaches, minimally invasive versus sternotomy. Dr. Luo feels the patient will benefit and is a candidate for a robotic assisted MIDCAB.All questions were addressed and patient agrees to proceed with surgery.     Plan:   PST  SDA  5/31  pt instructed to take Atenolol morning of surgery  instructions provided re antibacterial showers and pt given 3 sponges

## 2017-05-30 NOTE — H&P ADULT - NSHPLABSRESULTS_GEN_ALL_CORE
Hgb A1C = 5.2  creat = 1.2  hct= 39.1  hgb= 13.5  plt= 246  WBC= 7.8  INR=1.10  tot alb= 4.0  tot bili= 0.4    TSH= 0.197    5/24/17 Chest xray: clear lungs    5/24/17 EKG: SB, 58 bpm    5/26/17 Carotid US: no hemodynamically significant stenosis    5/24/17 PFT's: FEV1 108%    5/26/17 Echo: mild LVH, EF 60%,     5/25/17 Cardiac Cath: 70%mid LAD ISR, 50%OM1 ISR, LVEF 50%, LVEDP 4mmHg, no MR, no AS

## 2017-05-31 ENCOUNTER — APPOINTMENT (OUTPATIENT)
Dept: CARDIOTHORACIC SURGERY | Facility: HOSPITAL | Age: 82
End: 2017-05-31

## 2017-05-31 ENCOUNTER — INPATIENT (INPATIENT)
Facility: HOSPITAL | Age: 82
LOS: 2 days | Discharge: HOME HEALTH | DRG: 236 | End: 2017-06-03
Attending: THORACIC SURGERY (CARDIOTHORACIC VASCULAR SURGERY) | Admitting: THORACIC SURGERY (CARDIOTHORACIC VASCULAR SURGERY)
Payer: MEDICARE

## 2017-05-31 DIAGNOSIS — I25.118 ATHEROSCLEROTIC HEART DISEASE OF NATIVE CORONARY ARTERY WITH OTHER FORMS OF ANGINA PECTORIS: ICD-10-CM

## 2017-05-31 LAB
ALBUMIN SERPL ELPH-MCNC: 4 G/DL — SIGNIFICANT CHANGE UP (ref 3.3–5)
ALP SERPL-CCNC: 79 U/L — SIGNIFICANT CHANGE UP (ref 40–120)
ALT FLD-CCNC: 19 U/L — SIGNIFICANT CHANGE UP (ref 10–45)
ANION GAP SERPL CALC-SCNC: 11 MMOL/L — SIGNIFICANT CHANGE UP (ref 5–17)
ANION GAP SERPL CALC-SCNC: 13 MMOL/L — SIGNIFICANT CHANGE UP (ref 5–17)
APTT BLD: 32.2 SEC — SIGNIFICANT CHANGE UP (ref 27.5–37.4)
APTT BLD: 42.1 SEC — HIGH (ref 27.5–37.4)
AST SERPL-CCNC: 23 U/L — SIGNIFICANT CHANGE UP (ref 10–40)
BASE EXCESS BLDA CALC-SCNC: 0.3 MMOL/L — SIGNIFICANT CHANGE UP (ref -2–3)
BASE EXCESS BLDA CALC-SCNC: 0.5 MMOL/L — SIGNIFICANT CHANGE UP (ref -2–3)
BASOPHILS NFR BLD AUTO: 0.1 % — SIGNIFICANT CHANGE UP (ref 0–2)
BILIRUB SERPL-MCNC: 0.8 MG/DL — SIGNIFICANT CHANGE UP (ref 0.2–1.2)
BUN SERPL-MCNC: 13 MG/DL — SIGNIFICANT CHANGE UP (ref 7–23)
BUN SERPL-MCNC: 14 MG/DL — SIGNIFICANT CHANGE UP (ref 7–23)
CA-I BLDA-SCNC: 1.11 MMOL/L — SIGNIFICANT CHANGE UP (ref 1.1–1.3)
CALCIUM SERPL-MCNC: 8.2 MG/DL — LOW (ref 8.4–10.5)
CALCIUM SERPL-MCNC: 8.4 MG/DL — SIGNIFICANT CHANGE UP (ref 8.4–10.5)
CHLORIDE SERPL-SCNC: 103 MMOL/L — SIGNIFICANT CHANGE UP (ref 96–108)
CHLORIDE SERPL-SCNC: 105 MMOL/L — SIGNIFICANT CHANGE UP (ref 96–108)
CO2 SERPL-SCNC: 22 MMOL/L — SIGNIFICANT CHANGE UP (ref 22–31)
CO2 SERPL-SCNC: 23 MMOL/L — SIGNIFICANT CHANGE UP (ref 22–31)
COHGB MFR BLDA: 0.3 % — SIGNIFICANT CHANGE UP
CREAT SERPL-MCNC: 1 MG/DL — SIGNIFICANT CHANGE UP (ref 0.5–1.3)
CREAT SERPL-MCNC: 1 MG/DL — SIGNIFICANT CHANGE UP (ref 0.5–1.3)
EOSINOPHIL NFR BLD AUTO: 2.4 % — SIGNIFICANT CHANGE UP (ref 0–6)
GAS PNL BLDA: SIGNIFICANT CHANGE UP
GLUCOSE SERPL-MCNC: 141 MG/DL — HIGH (ref 70–99)
GLUCOSE SERPL-MCNC: 145 MG/DL — HIGH (ref 70–99)
HCO3 BLDA-SCNC: 24 MMOL/L — SIGNIFICANT CHANGE UP (ref 21–28)
HCO3 BLDA-SCNC: 24 MMOL/L — SIGNIFICANT CHANGE UP (ref 21–28)
HCT VFR BLD CALC: 32.2 % — LOW (ref 39–50)
HCT VFR BLD CALC: 32.7 % — LOW (ref 39–50)
HGB BLD-MCNC: 11.2 G/DL — LOW (ref 13–17)
HGB BLD-MCNC: 11.5 G/DL — LOW (ref 13–17)
HGB BLDA-MCNC: 12.4 G/DL — LOW (ref 13–17)
INR BLD: 1.19 — HIGH (ref 0.88–1.16)
INR BLD: 1.23 — HIGH (ref 0.88–1.16)
LYMPHOCYTES # BLD AUTO: 11.2 % — LOW (ref 13–44)
MAGNESIUM SERPL-MCNC: 1.4 MG/DL — LOW (ref 1.6–2.6)
MCHC RBC-ENTMCNC: 29.9 PG — SIGNIFICANT CHANGE UP (ref 27–34)
MCHC RBC-ENTMCNC: 30.7 PG — SIGNIFICANT CHANGE UP (ref 27–34)
MCHC RBC-ENTMCNC: 34.8 G/DL — SIGNIFICANT CHANGE UP (ref 32–36)
MCHC RBC-ENTMCNC: 35.2 G/DL — SIGNIFICANT CHANGE UP (ref 32–36)
MCV RBC AUTO: 86.1 FL — SIGNIFICANT CHANGE UP (ref 80–100)
MCV RBC AUTO: 87.2 FL — SIGNIFICANT CHANGE UP (ref 80–100)
METHGB MFR BLDA: 0.3 % — SIGNIFICANT CHANGE UP
MONOCYTES NFR BLD AUTO: 5.8 % — SIGNIFICANT CHANGE UP (ref 2–14)
NEUTROPHILS NFR BLD AUTO: 80.5 % — HIGH (ref 43–77)
O2 CT VFR BLDA CALC: 18.4 ML/DL — SIGNIFICANT CHANGE UP (ref 15–23)
OXYHGB MFR BLDA: 98 % — SIGNIFICANT CHANGE UP (ref 94–100)
PCO2 BLDA: 34 MMHG — LOW (ref 35–48)
PCO2 BLDA: 35 MMHG — SIGNIFICANT CHANGE UP (ref 35–48)
PH BLDA: 7.46 — HIGH (ref 7.35–7.45)
PH BLDA: 7.46 — HIGH (ref 7.35–7.45)
PLATELET # BLD AUTO: 206 K/UL — SIGNIFICANT CHANGE UP (ref 150–400)
PLATELET # BLD AUTO: 207 K/UL — SIGNIFICANT CHANGE UP (ref 150–400)
PO2 BLDA: 182 MMHG — HIGH (ref 83–108)
PO2 BLDA: 440 MMHG — HIGH (ref 83–108)
POTASSIUM BLDA-SCNC: 3.8 MMOL/L — SIGNIFICANT CHANGE UP (ref 3.5–4.9)
POTASSIUM SERPL-MCNC: 3.9 MMOL/L — SIGNIFICANT CHANGE UP (ref 3.5–5.3)
POTASSIUM SERPL-MCNC: 4.4 MMOL/L — SIGNIFICANT CHANGE UP (ref 3.5–5.3)
POTASSIUM SERPL-SCNC: 3.9 MMOL/L — SIGNIFICANT CHANGE UP (ref 3.5–5.3)
POTASSIUM SERPL-SCNC: 4.4 MMOL/L — SIGNIFICANT CHANGE UP (ref 3.5–5.3)
PROT SERPL-MCNC: 6.2 G/DL — SIGNIFICANT CHANGE UP (ref 6–8.3)
PROTHROM AB SERPL-ACNC: 13.2 SEC — HIGH (ref 9.8–12.7)
PROTHROM AB SERPL-ACNC: 13.7 SEC — HIGH (ref 9.8–12.7)
RBC # BLD: 3.74 M/UL — LOW (ref 4.2–5.8)
RBC # BLD: 3.75 M/UL — LOW (ref 4.2–5.8)
RBC # FLD: 13 % — SIGNIFICANT CHANGE UP (ref 10.3–16.9)
RBC # FLD: 13.3 % — SIGNIFICANT CHANGE UP (ref 10.3–16.9)
SAO2 % BLDA: 99 % — SIGNIFICANT CHANGE UP (ref 95–100)
SAO2 % BLDA: 99 % — SIGNIFICANT CHANGE UP (ref 95–100)
SODIUM BLDA-SCNC: 138 MMOL/L — SIGNIFICANT CHANGE UP (ref 138–146)
SODIUM SERPL-SCNC: 138 MMOL/L — SIGNIFICANT CHANGE UP (ref 135–145)
SODIUM SERPL-SCNC: 139 MMOL/L — SIGNIFICANT CHANGE UP (ref 135–145)
WBC # BLD: 10.2 K/UL — SIGNIFICANT CHANGE UP (ref 3.8–10.5)
WBC # BLD: 10.7 K/UL — HIGH (ref 3.8–10.5)
WBC # FLD AUTO: 10.2 K/UL — SIGNIFICANT CHANGE UP (ref 3.8–10.5)
WBC # FLD AUTO: 10.7 K/UL — HIGH (ref 3.8–10.5)

## 2017-05-31 PROCEDURE — 76998 US GUIDE INTRAOP: CPT | Mod: 26,59

## 2017-05-31 PROCEDURE — 33533 CABG ARTERIAL SINGLE: CPT

## 2017-05-31 PROCEDURE — 71010: CPT | Mod: 26

## 2017-05-31 PROCEDURE — 99291 CRITICAL CARE FIRST HOUR: CPT

## 2017-05-31 PROCEDURE — 93010 ELECTROCARDIOGRAM REPORT: CPT

## 2017-05-31 RX ORDER — CHLORHEXIDINE GLUCONATE 213 G/1000ML
5 SOLUTION TOPICAL EVERY 4 HOURS
Qty: 0 | Refills: 0 | Status: DISCONTINUED | OUTPATIENT
Start: 2017-05-31 | End: 2017-05-31

## 2017-05-31 RX ORDER — ALBUMIN HUMAN 25 %
250 VIAL (ML) INTRAVENOUS ONCE
Qty: 0 | Refills: 0 | Status: COMPLETED | OUTPATIENT
Start: 2017-05-31 | End: 2017-05-31

## 2017-05-31 RX ORDER — NICARDIPINE HYDROCHLORIDE 30 MG/1
2.5 CAPSULE, EXTENDED RELEASE ORAL
Qty: 40 | Refills: 0 | Status: DISCONTINUED | OUTPATIENT
Start: 2017-05-31 | End: 2017-06-01

## 2017-05-31 RX ORDER — ASPIRIN/CALCIUM CARB/MAGNESIUM 324 MG
81 TABLET ORAL DAILY
Qty: 0 | Refills: 0 | Status: DISCONTINUED | OUTPATIENT
Start: 2017-05-31 | End: 2017-06-03

## 2017-05-31 RX ORDER — ATORVASTATIN CALCIUM 80 MG/1
20 TABLET, FILM COATED ORAL AT BEDTIME
Qty: 0 | Refills: 0 | Status: DISCONTINUED | OUTPATIENT
Start: 2017-05-31 | End: 2017-06-03

## 2017-05-31 RX ORDER — FENTANYL CITRATE 50 UG/ML
25 INJECTION INTRAVENOUS ONCE
Qty: 0 | Refills: 0 | Status: DISCONTINUED | OUTPATIENT
Start: 2017-05-31 | End: 2017-05-31

## 2017-05-31 RX ORDER — BUPIVACAINE 13.3 MG/ML
20 INJECTION, SUSPENSION, LIPOSOMAL INFILTRATION ONCE
Qty: 0 | Refills: 0 | Status: DISCONTINUED | OUTPATIENT
Start: 2017-05-31 | End: 2017-05-31

## 2017-05-31 RX ORDER — POTASSIUM CHLORIDE 20 MEQ
20 PACKET (EA) ORAL ONCE
Qty: 0 | Refills: 0 | Status: COMPLETED | OUTPATIENT
Start: 2017-05-31 | End: 2017-05-31

## 2017-05-31 RX ORDER — DEXMEDETOMIDINE HYDROCHLORIDE IN 0.9% SODIUM CHLORIDE 4 UG/ML
0.3 INJECTION INTRAVENOUS
Qty: 200 | Refills: 0 | Status: DISCONTINUED | OUTPATIENT
Start: 2017-05-31 | End: 2017-05-31

## 2017-05-31 RX ORDER — CLOPIDOGREL BISULFATE 75 MG/1
75 TABLET, FILM COATED ORAL DAILY
Qty: 0 | Refills: 0 | Status: DISCONTINUED | OUTPATIENT
Start: 2017-05-31 | End: 2017-06-03

## 2017-05-31 RX ORDER — SODIUM CHLORIDE 9 MG/ML
500 INJECTION, SOLUTION INTRAVENOUS ONCE
Qty: 0 | Refills: 0 | Status: COMPLETED | OUTPATIENT
Start: 2017-05-31 | End: 2017-05-31

## 2017-05-31 RX ORDER — INSULIN HUMAN 100 [IU]/ML
1 INJECTION, SOLUTION SUBCUTANEOUS
Qty: 100 | Refills: 0 | Status: DISCONTINUED | OUTPATIENT
Start: 2017-05-31 | End: 2017-06-01

## 2017-05-31 RX ORDER — HEPARIN SODIUM 5000 [USP'U]/ML
5000 INJECTION INTRAVENOUS; SUBCUTANEOUS EVERY 8 HOURS
Qty: 0 | Refills: 0 | Status: DISCONTINUED | OUTPATIENT
Start: 2017-05-31 | End: 2017-06-03

## 2017-05-31 RX ORDER — MAGNESIUM SULFATE 500 MG/ML
2 VIAL (ML) INJECTION ONCE
Qty: 0 | Refills: 0 | Status: COMPLETED | OUTPATIENT
Start: 2017-05-31 | End: 2017-05-31

## 2017-05-31 RX ORDER — FAMOTIDINE 10 MG/ML
20 INJECTION INTRAVENOUS EVERY 12 HOURS
Qty: 0 | Refills: 0 | Status: DISCONTINUED | OUTPATIENT
Start: 2017-05-31 | End: 2017-05-31

## 2017-05-31 RX ORDER — CEFAZOLIN SODIUM 1 G
2000 VIAL (EA) INJECTION EVERY 8 HOURS
Qty: 0 | Refills: 0 | Status: COMPLETED | OUTPATIENT
Start: 2017-05-31 | End: 2017-06-02

## 2017-05-31 RX ORDER — ALBUMIN HUMAN 25 %
250 VIAL (ML) INTRAVENOUS
Qty: 0 | Refills: 0 | Status: COMPLETED | OUTPATIENT
Start: 2017-05-31 | End: 2017-05-31

## 2017-05-31 RX ORDER — CALCIUM GLUCONATE 100 MG/ML
2 VIAL (ML) INTRAVENOUS ONCE
Qty: 0 | Refills: 0 | Status: COMPLETED | OUTPATIENT
Start: 2017-05-31 | End: 2017-05-31

## 2017-05-31 RX ORDER — SODIUM CHLORIDE 9 MG/ML
1000 INJECTION, SOLUTION INTRAVENOUS
Qty: 0 | Refills: 0 | Status: DISCONTINUED | OUTPATIENT
Start: 2017-05-31 | End: 2017-06-03

## 2017-05-31 RX ORDER — ACETAMINOPHEN 500 MG
1000 TABLET ORAL ONCE
Qty: 0 | Refills: 0 | Status: COMPLETED | OUTPATIENT
Start: 2017-05-31 | End: 2017-05-31

## 2017-05-31 RX ORDER — FAMOTIDINE 10 MG/ML
20 INJECTION INTRAVENOUS DAILY
Qty: 0 | Refills: 0 | Status: DISCONTINUED | OUTPATIENT
Start: 2017-05-31 | End: 2017-06-03

## 2017-05-31 RX ADMIN — Medication 50 GRAM(S): at 22:51

## 2017-05-31 RX ADMIN — NICARDIPINE HYDROCHLORIDE 12.5 MG/HR: 30 CAPSULE, EXTENDED RELEASE ORAL at 16:32

## 2017-05-31 RX ADMIN — FAMOTIDINE 20 MILLIGRAM(S): 10 INJECTION INTRAVENOUS at 22:59

## 2017-05-31 RX ADMIN — Medication 1000 MILLIGRAM(S): at 22:30

## 2017-05-31 RX ADMIN — Medication 200 GRAM(S): at 22:50

## 2017-05-31 RX ADMIN — Medication 81 MILLIGRAM(S): at 22:59

## 2017-05-31 RX ADMIN — Medication 100 MILLIEQUIVALENT(S): at 19:50

## 2017-05-31 RX ADMIN — SODIUM CHLORIDE 500 MILLILITER(S): 9 INJECTION, SOLUTION INTRAVENOUS at 19:00

## 2017-05-31 RX ADMIN — Medication 100 MILLIEQUIVALENT(S): at 16:24

## 2017-05-31 RX ADMIN — FAMOTIDINE 20 MILLIGRAM(S): 10 INJECTION INTRAVENOUS at 18:06

## 2017-05-31 RX ADMIN — CLOPIDOGREL BISULFATE 75 MILLIGRAM(S): 75 TABLET, FILM COATED ORAL at 22:59

## 2017-05-31 RX ADMIN — FENTANYL CITRATE 25 MICROGRAM(S): 50 INJECTION INTRAVENOUS at 16:26

## 2017-05-31 RX ADMIN — HEPARIN SODIUM 5000 UNIT(S): 5000 INJECTION INTRAVENOUS; SUBCUTANEOUS at 22:51

## 2017-05-31 RX ADMIN — CHLORHEXIDINE GLUCONATE 5 MILLILITER(S): 213 SOLUTION TOPICAL at 18:09

## 2017-05-31 RX ADMIN — Medication 125 MILLILITER(S): at 18:07

## 2017-05-31 RX ADMIN — ATORVASTATIN CALCIUM 20 MILLIGRAM(S): 80 TABLET, FILM COATED ORAL at 22:50

## 2017-05-31 RX ADMIN — DEXMEDETOMIDINE HYDROCHLORIDE IN 0.9% SODIUM CHLORIDE 5.85 MICROGRAM(S)/KG/HR: 4 INJECTION INTRAVENOUS at 16:15

## 2017-05-31 RX ADMIN — Medication 125 MILLILITER(S): at 18:08

## 2017-05-31 RX ADMIN — FENTANYL CITRATE 25 MICROGRAM(S): 50 INJECTION INTRAVENOUS at 16:20

## 2017-05-31 RX ADMIN — FENTANYL CITRATE 25 MICROGRAM(S): 50 INJECTION INTRAVENOUS at 16:45

## 2017-05-31 RX ADMIN — Medication 125 MILLILITER(S): at 18:47

## 2017-05-31 RX ADMIN — Medication 100 MILLIEQUIVALENT(S): at 18:46

## 2017-05-31 RX ADMIN — Medication 400 MILLIGRAM(S): at 21:06

## 2017-05-31 RX ADMIN — Medication 100 MILLIGRAM(S): at 20:00

## 2017-05-31 RX ADMIN — FENTANYL CITRATE 25 MICROGRAM(S): 50 INJECTION INTRAVENOUS at 16:03

## 2017-05-31 NOTE — PROGRESS NOTE ADULT - SUBJECTIVE AND OBJECTIVE BOX
CTICU  CRITICAL  CARE  attending     Hand off received 					   Pertinent clinical, laboratory, radiographic, hemodynamic, echocardiographic, respiratory data, microbiologic data and chart were reviewed and analyzed frequently throughout the course of the day and night  Patient seen and examined with CTS/ SH attending at bedside  Pt is a 84y , Male with HTN, HLD, CA Prostate S/P RT, CAD S/P PCI of OM and mid LAD.      FAMILY HISTORY:  Family history of MI (myocardial infarction) (Mother)  No pertinent family history: No significant family history  PAST MEDICAL & SURGICAL HISTORY:  Stented coronary artery  Myocardial infarction  HLD (hyperlipidemia)  HTN (hypertension)  Other postprocedural status: S/P hernia repair  Other acquired absence of organ: S/P cholecystectomy    Patient is a 84y old  Male who presents with a chief complaint of MIDCAB (30 May 2017 17:04)      14 system review was unremarkable  acute changes include acute respiratory failure  Vital signs, hemodynamic and respiratory parameters were reviewed from the bedside nursing flowsheet.  ICU Vital Signs Last 24 Hrs  T(C): 36.3, Max: 36.3 (05-31 @ 21:18)  T(F): 97.4, Max: 97.4 (05-31 @ 21:18)  HR: 76 (56 - 76)  BP: --  BP(mean): --  ABP: 168/56 (120/46 - 168/56)  ABP(mean): 92 (72 - 92)  RR: 24 (10 - 24)  SpO2: 99% (99% - 100%)    Adult Advanced Hemodynamics Last 24 Hrs  CVP(mm Hg): 11 (6 - 13)  CVP(cm H2O): --  CO: --  CI: --  PA: --  PA(mean): --  PCWP: --  SVR: --  SVRI: --  PVR: --  PVRI: --, ABG - ( 31 May 2017 18:22 )  pH: 7.37  /  pCO2: 36    /  pO2: 117   / HCO3: 20    / Base Excess: -4.5  /  SaO2: 98                Mode: CPAP with PS  FiO2: 40  PEEP: 5  PS: 10  MAP: 8  PIP: 15    Intake and output was reviewed and the fluid balance was calculated  Daily     Daily   I&O's Summary    I & Os for current day (as of 31 May 2017 22:45)  =============================================  IN: 1758.6 ml / OUT: 695 ml / NET: 1063.6 ml      All lines and drain sites were assessed  Glycemic trend was reviewed CAPILLARY BLOOD GLUCOSE: 128     NEURO: No acute change in mental status.  CVS: S1, S2, NoS3. + pericardial rub.  RESPIRATORY: Auscultation of the chest reveals equal breath sounds.  GI: Abdomen is soft. No tenderness. + bowel sounds.  Extremities are warm and well perfused. + distal pulses.  Wounds appear clean and unremarkable  Antibiotics are perioperative ancef.    labs  CBC Full  -  ( 31 May 2017 19:45 )  WBC Count : 10.7 K/uL  Hemoglobin : 11.5 g/dL  Hematocrit : 32.7 %  Platelet Count - Automated : 207 K/uL  Mean Cell Volume : 87.2 fL  Mean Cell Hemoglobin : 30.7 pg  Mean Cell Hemoglobin Concentration : 35.2 g/dL  Auto Neutrophil # : x  Auto Lymphocyte # : x  Auto Monocyte # : x  Auto Eosinophil # : x  Auto Basophil # : x  Auto Neutrophil % : x  Auto Lymphocyte % : x  Auto Monocyte % : x  Auto Eosinophil % : x  Auto Basophil % : x    05-31    139  |  103  |  14  ----------------------------<  145<H>  4.4   |  23  |  1.00    Ca    8.4      31 May 2017 19:45  Mg     1.4     05-31    TPro  6.2  /  Alb  4.0  /  TBili  0.8  /  DBili  x   /  AST  23  /  ALT  19  /  AlkPhos  79  05-31    PT/INR - ( 31 May 2017 19:45 )   PT: 13.2 sec;   INR: 1.19          PTT - ( 31 May 2017 19:45 )  PTT:42.1 sec  The current medications were reviewed   MEDICATIONS  (STANDING):  aspirin enteric coated 81milliGRAM(s) Oral daily  clopidogrel Tablet 75milliGRAM(s) Oral daily  sodium chloride 0.45%. 1000milliLiter(s) IV Continuous <Continuous>  heparin  Injectable 5000Unit(s) SubCutaneous every 8 hours  ceFAZolin   IVPB 2000milliGRAM(s) IV Intermittent every 8 hours  insulin Infusion 1Unit(s)/Hr IV Continuous <Continuous>  atorvastatin 20milliGRAM(s) Oral at bedtime  niCARdipine Infusion 2.5mG/Hr IV Continuous <Continuous>  albumin human  5% IVPB 250milliLiter(s) IV Intermittent every 30 minutes  magnesium sulfate  IVPB 2Gram(s) IV Intermittent once  calcium gluconate IVPB 2Gram(s) IV Intermittent once  famotidine    Tablet 20milliGRAM(s) Oral daily            Patient is a 84y old  Male with CAD   S/P CABG  Hemodynamically stable.  Good oxygenation.  Diuresing well.      My plan includes :  Discontinue nicardipine.   D/C lines in AM.  Beta blocker Rx.  Dual antiplatelet Rx.  Monitor for arrhythmias and monitor parameters for organ perfusion  Monitor neurologic status  Head of the bed should remain elevated to 45 deg .   Chest PT and IS will be encouraged  Monitor adequacy of oxygenation and ventilation and attempt to wean oxygen  Nutritional goals will be met using po eventually , ensure adequate caloric intake and monitor the same  Stress ulcer and VTE prophylaxis will be achieved    Glycemic control is satisfactory  Electrolytes have been repleted as necessary and wound care has been carried out. Pain control has been achieved.   Aggressive  physical therapy and early mobility and ambulation goals will be met   The family was updated about the course and plan  CRITICAL CARE TIME SPENT in evaluation and management, reassessments, review and interpretation of labs and x-rays, ventilator and hemodynamic management, formulating a plan and coordinating care: ___30____ MIN.  Time does not include procedural time.  CTICU ATTENDING     					    Shaquille Arciniega MD

## 2017-06-01 LAB
ALBUMIN SERPL ELPH-MCNC: 3.5 G/DL — SIGNIFICANT CHANGE UP (ref 3.3–5)
ALP SERPL-CCNC: 68 U/L — SIGNIFICANT CHANGE UP (ref 40–120)
ALT FLD-CCNC: 16 U/L — SIGNIFICANT CHANGE UP (ref 10–45)
ANION GAP SERPL CALC-SCNC: 11 MMOL/L — SIGNIFICANT CHANGE UP (ref 5–17)
ANION GAP SERPL CALC-SCNC: 16 MMOL/L — SIGNIFICANT CHANGE UP (ref 5–17)
APTT BLD: 26.4 SEC — LOW (ref 27.5–37.4)
AST SERPL-CCNC: 23 U/L — SIGNIFICANT CHANGE UP (ref 10–40)
BASE EXCESS BLDA CALC-SCNC: 0.9 MMOL/L — SIGNIFICANT CHANGE UP (ref -2–3)
BILIRUB SERPL-MCNC: 0.7 MG/DL — SIGNIFICANT CHANGE UP (ref 0.2–1.2)
BUN SERPL-MCNC: 13 MG/DL — SIGNIFICANT CHANGE UP (ref 7–23)
BUN SERPL-MCNC: 19 MG/DL — SIGNIFICANT CHANGE UP (ref 7–23)
CALCIUM SERPL-MCNC: 8.8 MG/DL — SIGNIFICANT CHANGE UP (ref 8.4–10.5)
CALCIUM SERPL-MCNC: 8.9 MG/DL — SIGNIFICANT CHANGE UP (ref 8.4–10.5)
CHLORIDE SERPL-SCNC: 100 MMOL/L — SIGNIFICANT CHANGE UP (ref 96–108)
CHLORIDE SERPL-SCNC: 103 MMOL/L — SIGNIFICANT CHANGE UP (ref 96–108)
CO2 SERPL-SCNC: 23 MMOL/L — SIGNIFICANT CHANGE UP (ref 22–31)
CO2 SERPL-SCNC: 23 MMOL/L — SIGNIFICANT CHANGE UP (ref 22–31)
CREAT SERPL-MCNC: 1.1 MG/DL — SIGNIFICANT CHANGE UP (ref 0.5–1.3)
CREAT SERPL-MCNC: 1.7 MG/DL — HIGH (ref 0.5–1.3)
GAS PNL BLDA: SIGNIFICANT CHANGE UP
GLUCOSE SERPL-MCNC: 100 MG/DL — HIGH (ref 70–99)
GLUCOSE SERPL-MCNC: 108 MG/DL — HIGH (ref 70–99)
HCO3 BLDA-SCNC: 25 MMOL/L — SIGNIFICANT CHANGE UP (ref 21–28)
HCT VFR BLD CALC: 29.3 % — LOW (ref 39–50)
HGB BLD-MCNC: 10.3 G/DL — LOW (ref 13–17)
INR BLD: 1.25 — HIGH (ref 0.88–1.16)
MAGNESIUM SERPL-MCNC: 2 MG/DL — SIGNIFICANT CHANGE UP (ref 1.6–2.6)
MCHC RBC-ENTMCNC: 30.6 PG — SIGNIFICANT CHANGE UP (ref 27–34)
MCHC RBC-ENTMCNC: 35.2 G/DL — SIGNIFICANT CHANGE UP (ref 32–36)
MCV RBC AUTO: 86.9 FL — SIGNIFICANT CHANGE UP (ref 80–100)
PCO2 BLDA: 37 MMHG — SIGNIFICANT CHANGE UP (ref 35–48)
PH BLDA: 7.44 — SIGNIFICANT CHANGE UP (ref 7.35–7.45)
PHOSPHATE SERPL-MCNC: 2.2 MG/DL — LOW (ref 2.5–4.5)
PLATELET # BLD AUTO: 192 K/UL — SIGNIFICANT CHANGE UP (ref 150–400)
PO2 BLDA: 144 MMHG — HIGH (ref 83–108)
POTASSIUM SERPL-MCNC: 4.3 MMOL/L — SIGNIFICANT CHANGE UP (ref 3.5–5.3)
POTASSIUM SERPL-MCNC: 4.5 MMOL/L — SIGNIFICANT CHANGE UP (ref 3.5–5.3)
POTASSIUM SERPL-SCNC: 4.3 MMOL/L — SIGNIFICANT CHANGE UP (ref 3.5–5.3)
POTASSIUM SERPL-SCNC: 4.5 MMOL/L — SIGNIFICANT CHANGE UP (ref 3.5–5.3)
PROT SERPL-MCNC: 5.4 G/DL — LOW (ref 6–8.3)
PROTHROM AB SERPL-ACNC: 13.9 SEC — HIGH (ref 9.8–12.7)
RBC # BLD: 3.37 M/UL — LOW (ref 4.2–5.8)
RBC # FLD: 13.3 % — SIGNIFICANT CHANGE UP (ref 10.3–16.9)
SAO2 % BLDA: 98 % — SIGNIFICANT CHANGE UP (ref 95–100)
SODIUM SERPL-SCNC: 137 MMOL/L — SIGNIFICANT CHANGE UP (ref 135–145)
SODIUM SERPL-SCNC: 139 MMOL/L — SIGNIFICANT CHANGE UP (ref 135–145)
WBC # BLD: 9.8 K/UL — SIGNIFICANT CHANGE UP (ref 3.8–10.5)
WBC # FLD AUTO: 9.8 K/UL — SIGNIFICANT CHANGE UP (ref 3.8–10.5)

## 2017-06-01 PROCEDURE — 71010: CPT | Mod: 26,76

## 2017-06-01 PROCEDURE — 93010 ELECTROCARDIOGRAM REPORT: CPT

## 2017-06-01 PROCEDURE — 99291 CRITICAL CARE FIRST HOUR: CPT

## 2017-06-01 RX ORDER — ACETAMINOPHEN 500 MG
650 TABLET ORAL EVERY 6 HOURS
Qty: 0 | Refills: 0 | Status: DISCONTINUED | OUTPATIENT
Start: 2017-06-01 | End: 2017-06-03

## 2017-06-01 RX ORDER — DEXTROSE 50 % IN WATER 50 %
25 SYRINGE (ML) INTRAVENOUS ONCE
Qty: 0 | Refills: 0 | Status: DISCONTINUED | OUTPATIENT
Start: 2017-06-01 | End: 2017-06-03

## 2017-06-01 RX ORDER — FENTANYL CITRATE 50 UG/ML
25 INJECTION INTRAVENOUS ONCE
Qty: 0 | Refills: 0 | Status: DISCONTINUED | OUTPATIENT
Start: 2017-06-01 | End: 2017-06-01

## 2017-06-01 RX ORDER — GLUCAGON INJECTION, SOLUTION 0.5 MG/.1ML
1 INJECTION, SOLUTION SUBCUTANEOUS ONCE
Qty: 0 | Refills: 0 | Status: DISCONTINUED | OUTPATIENT
Start: 2017-06-01 | End: 2017-06-03

## 2017-06-01 RX ORDER — DEXTROSE 50 % IN WATER 50 %
1 SYRINGE (ML) INTRAVENOUS ONCE
Qty: 0 | Refills: 0 | Status: DISCONTINUED | OUTPATIENT
Start: 2017-06-01 | End: 2017-06-03

## 2017-06-01 RX ORDER — SODIUM CHLORIDE 9 MG/ML
500 INJECTION, SOLUTION INTRAVENOUS ONCE
Qty: 0 | Refills: 0 | Status: COMPLETED | OUTPATIENT
Start: 2017-06-01 | End: 2017-06-01

## 2017-06-01 RX ORDER — SODIUM CHLORIDE 9 MG/ML
1000 INJECTION, SOLUTION INTRAVENOUS
Qty: 0 | Refills: 0 | Status: DISCONTINUED | OUTPATIENT
Start: 2017-06-01 | End: 2017-06-02

## 2017-06-01 RX ORDER — SODIUM CHLORIDE 9 MG/ML
1000 INJECTION, SOLUTION INTRAVENOUS
Qty: 0 | Refills: 0 | Status: DISCONTINUED | OUTPATIENT
Start: 2017-06-01 | End: 2017-06-03

## 2017-06-01 RX ORDER — ALBUMIN HUMAN 25 %
250 VIAL (ML) INTRAVENOUS
Qty: 0 | Refills: 0 | Status: DISCONTINUED | OUTPATIENT
Start: 2017-06-01 | End: 2017-06-03

## 2017-06-01 RX ORDER — METOPROLOL TARTRATE 50 MG
12.5 TABLET ORAL EVERY 8 HOURS
Qty: 0 | Refills: 0 | Status: DISCONTINUED | OUTPATIENT
Start: 2017-06-01 | End: 2017-06-01

## 2017-06-01 RX ORDER — BENZOCAINE AND MENTHOL 5; 1 G/100ML; G/100ML
1 LIQUID ORAL
Qty: 0 | Refills: 0 | Status: DISCONTINUED | OUTPATIENT
Start: 2017-06-01 | End: 2017-06-03

## 2017-06-01 RX ORDER — POTASSIUM PHOSPHATE, MONOBASIC POTASSIUM PHOSPHATE, DIBASIC 236; 224 MG/ML; MG/ML
15 INJECTION, SOLUTION INTRAVENOUS ONCE
Qty: 0 | Refills: 0 | Status: COMPLETED | OUTPATIENT
Start: 2017-06-01 | End: 2017-06-01

## 2017-06-01 RX ORDER — INSULIN LISPRO 100/ML
VIAL (ML) SUBCUTANEOUS
Qty: 0 | Refills: 0 | Status: DISCONTINUED | OUTPATIENT
Start: 2017-06-01 | End: 2017-06-03

## 2017-06-01 RX ORDER — ACETAMINOPHEN 500 MG
1000 TABLET ORAL ONCE
Qty: 0 | Refills: 0 | Status: COMPLETED | OUTPATIENT
Start: 2017-06-01 | End: 2017-06-01

## 2017-06-01 RX ORDER — METOCLOPRAMIDE HCL 10 MG
10 TABLET ORAL ONCE
Qty: 0 | Refills: 0 | Status: DISCONTINUED | OUTPATIENT
Start: 2017-06-01 | End: 2017-06-01

## 2017-06-01 RX ORDER — POLYETHYLENE GLYCOL 3350 17 G/17G
17 POWDER, FOR SOLUTION ORAL ONCE
Qty: 0 | Refills: 0 | Status: COMPLETED | OUTPATIENT
Start: 2017-06-01 | End: 2017-06-01

## 2017-06-01 RX ORDER — DEXTROSE 50 % IN WATER 50 %
12.5 SYRINGE (ML) INTRAVENOUS ONCE
Qty: 0 | Refills: 0 | Status: DISCONTINUED | OUTPATIENT
Start: 2017-06-01 | End: 2017-06-03

## 2017-06-01 RX ADMIN — Medication 63.75 MILLIMOLE(S): at 04:30

## 2017-06-01 RX ADMIN — Medication 125 MILLILITER(S): at 08:30

## 2017-06-01 RX ADMIN — HEPARIN SODIUM 5000 UNIT(S): 5000 INJECTION INTRAVENOUS; SUBCUTANEOUS at 06:13

## 2017-06-01 RX ADMIN — Medication 650 MILLIGRAM(S): at 15:30

## 2017-06-01 RX ADMIN — Medication 400 MILLIGRAM(S): at 10:30

## 2017-06-01 RX ADMIN — Medication 100 MILLIGRAM(S): at 21:05

## 2017-06-01 RX ADMIN — FENTANYL CITRATE 25 MICROGRAM(S): 50 INJECTION INTRAVENOUS at 09:00

## 2017-06-01 RX ADMIN — Medication 100 MILLIGRAM(S): at 12:17

## 2017-06-01 RX ADMIN — CLOPIDOGREL BISULFATE 75 MILLIGRAM(S): 75 TABLET, FILM COATED ORAL at 12:17

## 2017-06-01 RX ADMIN — FENTANYL CITRATE 25 MICROGRAM(S): 50 INJECTION INTRAVENOUS at 09:30

## 2017-06-01 RX ADMIN — HEPARIN SODIUM 5000 UNIT(S): 5000 INJECTION INTRAVENOUS; SUBCUTANEOUS at 21:05

## 2017-06-01 RX ADMIN — Medication 125 MILLILITER(S): at 08:59

## 2017-06-01 RX ADMIN — Medication 650 MILLIGRAM(S): at 23:26

## 2017-06-01 RX ADMIN — FAMOTIDINE 20 MILLIGRAM(S): 10 INJECTION INTRAVENOUS at 12:17

## 2017-06-01 RX ADMIN — ATORVASTATIN CALCIUM 20 MILLIGRAM(S): 80 TABLET, FILM COATED ORAL at 21:05

## 2017-06-01 RX ADMIN — POLYETHYLENE GLYCOL 3350 17 GRAM(S): 17 POWDER, FOR SOLUTION ORAL at 14:21

## 2017-06-01 RX ADMIN — SODIUM CHLORIDE 100 MILLILITER(S): 9 INJECTION, SOLUTION INTRAVENOUS at 18:19

## 2017-06-01 RX ADMIN — Medication 650 MILLIGRAM(S): at 16:16

## 2017-06-01 RX ADMIN — POTASSIUM PHOSPHATE, MONOBASIC POTASSIUM PHOSPHATE, DIBASIC 62.5 MILLIMOLE(S): 236; 224 INJECTION, SOLUTION INTRAVENOUS at 08:05

## 2017-06-01 RX ADMIN — BENZOCAINE AND MENTHOL 1 LOZENGE: 5; 1 LIQUID ORAL at 21:05

## 2017-06-01 RX ADMIN — Medication 100 MILLIGRAM(S): at 04:00

## 2017-06-01 RX ADMIN — Medication 81 MILLIGRAM(S): at 12:18

## 2017-06-01 RX ADMIN — SODIUM CHLORIDE 1000 MILLILITER(S): 9 INJECTION, SOLUTION INTRAVENOUS at 08:30

## 2017-06-01 RX ADMIN — Medication 1000 MILLIGRAM(S): at 11:28

## 2017-06-01 RX ADMIN — HEPARIN SODIUM 5000 UNIT(S): 5000 INJECTION INTRAVENOUS; SUBCUTANEOUS at 14:21

## 2017-06-01 RX ADMIN — BENZOCAINE AND MENTHOL 1 LOZENGE: 5; 1 LIQUID ORAL at 15:19

## 2017-06-01 NOTE — PHYSICAL THERAPY INITIAL EVALUATION ADULT - GENERAL OBSERVATIONS, REHAB EVAL
Patient received seated out of bed no acute distress +telemetry +4L O2 NC +chest tube to wall suction +SCDs +IV +EKG. Patient left semi-supine in bed all lines intact call orozco in reach. ROSALINDA Agosto present.

## 2017-06-01 NOTE — PROGRESS NOTE ADULT - SUBJECTIVE AND OBJECTIVE BOX
Patient discussed on morning rounds with Dr. Luo      Operation / Date: 5/31/17 MIDCABx1    SUBJECTIVE ASSESSMENT:    84y Male pod#1 s/p MIDCAB.  He has no complaints at present.  He reports pain is well controlled, denies dyspnea/SOB, fevers/chills, nausea/vomitting.  He has no other present concerns.    Vital Signs Last 24 Hrs  T(C): 36.6, Max: 36.9 (06-01 @ 09:00)  T(F): 97.9, Max: 98.4 (06-01 @ 09:00)  HR: 52 (50 - 76)  BP: 99/51 (95/47 - 120/48)  BP(mean): 73 (71 - 76)  RR: 25 (14 - 35)  SpO2: 94% (94% - 100%)  I&O's Detail  I & Os for 24h ending 01 Jun 2017 07:00  =============================================  IN:    Albumin 5%  - 250 mL: 750 ml    IV PiggyBack: 550 ml    Lactated Ringers IV Bolus: 500 ml    Solution: 191.1 ml    sodium chloride 0.45%.: 140 ml    insulin Infusion: 25 ml    niCARdipine Infusion: 25 ml    dexmedetomidine Infusion: 11.6 ml    Total IN: 2192.7 ml  ---------------------------------------------  OUT:    Indwelling Catheter - Urethral: 1302 ml    Chest Tube: 400 ml    Total OUT: 1702 ml  ---------------------------------------------  Total NET: 490.7 ml    I & Os for current day (as of 01 Jun 2017 17:51)  =============================================  IN:    Oral Fluid: 580 ml    Albumin 5%  - 250 mL: 500 ml    Lactated Ringers IV Bolus: 500 ml    Solution: 187.5 ml    Solution: 127.4 ml    IV PiggyBack: 100 ml    sodium chloride 0.45%.: 20 ml    Total IN: 2014.9 ml  ---------------------------------------------  OUT:    Voided: 250 ml    Chest Tube: 150 ml    Indwelling Catheter - Urethral: 50 ml    Total OUT: 450 ml  ---------------------------------------------  Total NET: 1564.9 ml      CHEST TUBE:  No.  OMAR DRAIN:  No.  EPICARDIAL WIRES: No.  TIE DOWNS: Yes x1.  SHELDON: No.    PHYSICAL EXAM:    General: NAD    Neurological: A&Ox3    Cardiovascular: S1S2 RRR    Respiratory: CTA b/l no W/R/R    Gastrointestinal: soft NT/ND +BS    Extremities: no edema    Vascular: warm and well perfsued bilaterally    Incision Sites: Left ant thoracotomy C/D/I    LABS:                        10.3   9.8   )-----------( 192      ( 01 Jun 2017 01:25 )             29.3       COUMADIN:  No.     PT/INR - ( 01 Jun 2017 01:25 )   PT: 13.9 sec;   INR: 1.25          PTT - ( 01 Jun 2017 01:25 )  PTT:26.4 sec    06-01    139  |  100  |  19  ----------------------------<  108<H>  4.5   |  23  |  1.70<H>    Ca    8.8      01 Jun 2017 16:32  Phos  2.2     06-01  Mg     2.0     06-01    TPro  5.4<L>  /  Alb  3.5  /  TBili  0.7  /  DBili  x   /  AST  23  /  ALT  16  /  AlkPhos  68  06-01          MEDICATIONS  (STANDING):  aspirin enteric coated 81milliGRAM(s) Oral daily  clopidogrel Tablet 75milliGRAM(s) Oral daily  sodium chloride 0.45%. 1000milliLiter(s) IV Continuous <Continuous>  heparin  Injectable 5000Unit(s) SubCutaneous every 8 hours  ceFAZolin   IVPB 2000milliGRAM(s) IV Intermittent every 8 hours  atorvastatin 20milliGRAM(s) Oral at bedtime  famotidine    Tablet 20milliGRAM(s) Oral daily  insulin lispro (HumaLOG) corrective regimen sliding scale  SubCutaneous Before meals and at bedtime  dextrose 5%. 1000milliLiter(s) IV Continuous <Continuous>  dextrose 50% Injectable 12.5Gram(s) IV Push once  dextrose 50% Injectable 25Gram(s) IV Push once  dextrose 50% Injectable 25Gram(s) IV Push once  albumin human  5% IVPB 250milliLiter(s) IV Intermittent every 10 minutes    MEDICATIONS  (PRN):  dextrose Gel 1Dose(s) Oral once PRN Blood Glucose LESS THAN 70 milliGRAM(s)/deciliter  glucagon  Injectable 1milliGRAM(s) IntraMuscular once PRN Glucose LESS THAN 70 milligrams/deciliter  acetaminophen   Tablet. 650milliGRAM(s) Oral every 6 hours PRN Moderate Pain (4 - 6)  oxyCODONE  5 mG/acetaminophen 325 mG 1Tablet(s) Oral every 6 hours PRN Severe Pain (7 - 10)  benzocaine 15 mG/menthol 3.6 mG Lozenge 1Lozenge Oral every 1 hour PRN Sore Throat

## 2017-06-01 NOTE — PROGRESS NOTE ADULT - ASSESSMENT
A/P: 85 y/o M pod#1 s/p MIDCAB  CVS: HD stable, sinus luba in 40's, with asymptomatic 3-second pause in afternoon   - holding beta blocker 2/2 bradycardia   - Zoll pads in place, atropine at bedside   - c/w ASA/plavix/statin  Pulm - satting well on RA   - c/w IS and ambulation  Renal - BUN/Cr 13/1.1   - cont to monitor I/O and trend Cr  GI - tolerating PO, good appetite, yet to have a BM   - c/w GI ppx and bowel regimen  Dispo - home over weekend

## 2017-06-01 NOTE — PHYSICAL THERAPY INITIAL EVALUATION ADULT - PERTINENT HX OF CURRENT PROBLEM, REHAB EVAL
85 yo male with a history of HTN, HLD and CAD s/p multiple PCI's last in 2016 presents with class 2-3 angina presenting for robotic assisted MIDCAB.

## 2017-06-01 NOTE — PROGRESS NOTE ADULT - SUBJECTIVE AND OBJECTIVE BOX
CTICU  CRITICAL  CARE  attending     Hand off received 					   Pertinent clinical, laboratory, radiographic, hemodynamic, echocardiographic, respiratory data, microbiologic data and chart were reviewed and analyzed frequently throughout the course of the day and night  Patient seen and examined with CTS/ SH attending at bedside  Pt is a 84y , Male, HEALTH ISSUES - PROBLEM Dx:      , FAMILY HISTORY:  Family history of MI (myocardial infarction) (Mother)  No pertinent family history: No significant family history  PAST MEDICAL & SURGICAL HISTORY:  Stented coronary artery  Myocardial infarction  HLD (hyperlipidemia)  HTN (hypertension)  Other postprocedural status: S/P hernia repair  Other acquired absence of organ: S/P cholecystectomy    Patient is a 84y old  Male who presents with a chief complaint of MIDCAB (30 May 2017 17:04)      14 system review was unremarkable  acute changes include acute respiratory failure  Vital signs, hemodynamic and respiratory parameters were reviewed from the bedside nursing flowsheet.  ICU Vital Signs Last 24 Hrs  T(C): 36.3, Max: 36.9 (06-01 @ 09:00)  T(F): 97.3, Max: 98.4 (06-01 @ 09:00)  HR: 64 (50 - 70)  BP: 107/53 (95/47 - 124/59)  BP(mean): 76 (71 - 76)  ABP: 134/30 (108/26 - 156/42)  ABP(mean): 54 (46 - 78)  RR: 16 (16 - 35)  SpO2: 95% (94% - 100%)    Adult Advanced Hemodynamics Last 24 Hrs  CVP(mm Hg): 2 (2 - 14)  CVP(cm H2O): --  CO: --  CI: --  PA: --  PA(mean): --  PCWP: --  SVR: --  SVRI: --  PVR: --  PVRI: --, ABG - ( 01 Jun 2017 01:22 )  pH: 7.44  /  pCO2: 37    /  pO2: 144   / HCO3: 25    / Base Excess: 0.9   /  SaO2: 98                  Intake and output was reviewed and the fluid balance was calculated  Daily     Daily   I&O's Summary  I & Os for 24h ending 01 Jun 2017 07:00  =============================================  IN: 2192.7 ml / OUT: 1702 ml / NET: 490.7 ml    I & Os for current day (as of 01 Jun 2017 23:21)  =============================================  IN: 2014.9 ml / OUT: 450 ml / NET: 1564.9 ml      All lines and drain sites were assessed  Glycemic trend was reviewedCAPILLARY BLOOD GLUCOSE  121 (01 Jun 2017 20:37)    No acute change in mental status  Auscultation of the chest reveals equal bs  Abdomen is soft  Extremities are warm and well perfused  Wounds appear clean and unremarkable  Antibiotics are periop    labs  CBC Full  -  ( 01 Jun 2017 01:25 )  WBC Count : 9.8 K/uL  Hemoglobin : 10.3 g/dL  Hematocrit : 29.3 %  Platelet Count - Automated : 192 K/uL  Mean Cell Volume : 86.9 fL  Mean Cell Hemoglobin : 30.6 pg  Mean Cell Hemoglobin Concentration : 35.2 g/dL  Auto Neutrophil # : x  Auto Lymphocyte # : x  Auto Monocyte # : x  Auto Eosinophil # : x  Auto Basophil # : x  Auto Neutrophil % : x  Auto Lymphocyte % : x  Auto Monocyte % : x  Auto Eosinophil % : x  Auto Basophil % : x    06-01    139  |  100  |  19  ----------------------------<  108<H>  4.5   |  23  |  1.70<H>    Ca    8.8      01 Jun 2017 16:32  Phos  2.2     06-01  Mg     2.0     06-01    TPro  5.4<L>  /  Alb  3.5  /  TBili  0.7  /  DBili  x   /  AST  23  /  ALT  16  /  AlkPhos  68  06-01    PT/INR - ( 01 Jun 2017 01:25 )   PT: 13.9 sec;   INR: 1.25          PTT - ( 01 Jun 2017 01:25 )  PTT:26.4 sec  The current medications were reviewed   MEDICATIONS  (STANDING):  aspirin enteric coated 81milliGRAM(s) Oral daily  clopidogrel Tablet 75milliGRAM(s) Oral daily  sodium chloride 0.45%. 1000milliLiter(s) IV Continuous <Continuous>  heparin  Injectable 5000Unit(s) SubCutaneous every 8 hours  ceFAZolin   IVPB 2000milliGRAM(s) IV Intermittent every 8 hours  atorvastatin 20milliGRAM(s) Oral at bedtime  famotidine    Tablet 20milliGRAM(s) Oral daily  insulin lispro (HumaLOG) corrective regimen sliding scale  SubCutaneous Before meals and at bedtime  dextrose 5%. 1000milliLiter(s) IV Continuous <Continuous>  dextrose 50% Injectable 12.5Gram(s) IV Push once  dextrose 50% Injectable 25Gram(s) IV Push once  dextrose 50% Injectable 25Gram(s) IV Push once  albumin human  5% IVPB 250milliLiter(s) IV Intermittent every 10 minutes  lactated ringers. 1000milliLiter(s) IV Continuous <Continuous>    MEDICATIONS  (PRN):  dextrose Gel 1Dose(s) Oral once PRN Blood Glucose LESS THAN 70 milliGRAM(s)/deciliter  glucagon  Injectable 1milliGRAM(s) IntraMuscular once PRN Glucose LESS THAN 70 milligrams/deciliter  acetaminophen   Tablet. 650milliGRAM(s) Oral every 6 hours PRN Moderate Pain (4 - 6)  oxyCODONE  5 mG/acetaminophen 325 mG 1Tablet(s) Oral every 6 hours PRN Severe Pain (7 - 10)  benzocaine 15 mG/menthol 3.6 mG Lozenge 1Lozenge Oral every 1 hour PRN Sore Throat       PROBLEM LIST/ ASSESSMENT:  HEALTH ISSUES - PROBLEM Dx:      ,   Patient is a 84y old  Male who presents with a chief complaint of MIDCAB (30 May 2017 17:04)       acute changes include acute respiratory failure    My plan includes :  close hemodynamic, ventilatory and drain monitoring and management per post op routine    Monitor for arrhythmias and monitor parameters for organ perfusion  monitor neurologic status  Head of the bed should remain elevated to 45 deg .   chest PT and IS will be encouraged  monitor adequacy of oxygenation and ventilation and attempt to wean oxygen  Nutritional goals will be met using po eventually , ensure adequate caloric intake and montior the same  Stress ulcer and VTE prophylaxis will be achieved    Glycemic control is satisfactory  Electrolytes have been repleted as necessary and wound care has been carried out. Pain control has been achieved.   agressive physical therapy and early mobility and ambulation goals will be met   The family was updated about the course and plan  CRITICAL CARE TIME SPENT in evaluation and management, reassessments, review and interpretation of labs and x-rays, ventilator and hemodynamic management, formulating a plan and coordinating care: ___90____ MIN.  Time does not include procedural time.  CTICU ATTENDING     					    Sudhakar Fuentes MD

## 2017-06-02 LAB
ANION GAP SERPL CALC-SCNC: 10 MMOL/L — SIGNIFICANT CHANGE UP (ref 5–17)
ANION GAP SERPL CALC-SCNC: 11 MMOL/L — SIGNIFICANT CHANGE UP (ref 5–17)
APPEARANCE UR: CLEAR — SIGNIFICANT CHANGE UP
BILIRUB UR-MCNC: NEGATIVE — SIGNIFICANT CHANGE UP
BUN SERPL-MCNC: 21 MG/DL — SIGNIFICANT CHANGE UP (ref 7–23)
BUN SERPL-MCNC: 23 MG/DL — SIGNIFICANT CHANGE UP (ref 7–23)
CALCIUM SERPL-MCNC: 7.8 MG/DL — LOW (ref 8.4–10.5)
CALCIUM SERPL-MCNC: 8.7 MG/DL — SIGNIFICANT CHANGE UP (ref 8.4–10.5)
CHLORIDE SERPL-SCNC: 100 MMOL/L — SIGNIFICANT CHANGE UP (ref 96–108)
CHLORIDE SERPL-SCNC: 100 MMOL/L — SIGNIFICANT CHANGE UP (ref 96–108)
CO2 SERPL-SCNC: 22 MMOL/L — SIGNIFICANT CHANGE UP (ref 22–31)
CO2 SERPL-SCNC: 24 MMOL/L — SIGNIFICANT CHANGE UP (ref 22–31)
COLOR SPEC: SIGNIFICANT CHANGE UP
CREAT SERPL-MCNC: 1.3 MG/DL — SIGNIFICANT CHANGE UP (ref 0.5–1.3)
CREAT SERPL-MCNC: 1.5 MG/DL — HIGH (ref 0.5–1.3)
DIFF PNL FLD: (no result)
GLUCOSE SERPL-MCNC: 101 MG/DL — HIGH (ref 70–99)
GLUCOSE SERPL-MCNC: 94 MG/DL — SIGNIFICANT CHANGE UP (ref 70–99)
GLUCOSE UR QL: NEGATIVE — SIGNIFICANT CHANGE UP
HCT VFR BLD CALC: 28.2 % — LOW (ref 39–50)
HCT VFR BLD CALC: 29.4 % — LOW (ref 39–50)
HGB BLD-MCNC: 10.3 G/DL — LOW (ref 13–17)
HGB BLD-MCNC: 9.9 G/DL — LOW (ref 13–17)
KETONES UR-MCNC: NEGATIVE — SIGNIFICANT CHANGE UP
LEUKOCYTE ESTERASE UR-ACNC: NEGATIVE — SIGNIFICANT CHANGE UP
MAGNESIUM SERPL-MCNC: 1.6 MG/DL — SIGNIFICANT CHANGE UP (ref 1.6–2.6)
MAGNESIUM SERPL-MCNC: 1.9 MG/DL — SIGNIFICANT CHANGE UP (ref 1.6–2.6)
MCHC RBC-ENTMCNC: 30.6 PG — SIGNIFICANT CHANGE UP (ref 27–34)
MCHC RBC-ENTMCNC: 30.7 PG — SIGNIFICANT CHANGE UP (ref 27–34)
MCHC RBC-ENTMCNC: 35 G/DL — SIGNIFICANT CHANGE UP (ref 32–36)
MCHC RBC-ENTMCNC: 35.1 G/DL — SIGNIFICANT CHANGE UP (ref 32–36)
MCV RBC AUTO: 87.2 FL — SIGNIFICANT CHANGE UP (ref 80–100)
MCV RBC AUTO: 87.3 FL — SIGNIFICANT CHANGE UP (ref 80–100)
NITRITE UR-MCNC: NEGATIVE — SIGNIFICANT CHANGE UP
PH UR: 5.5 — SIGNIFICANT CHANGE UP (ref 5–8)
PHOSPHATE SERPL-MCNC: 2.9 MG/DL — SIGNIFICANT CHANGE UP (ref 2.5–4.5)
PLATELET # BLD AUTO: 176 K/UL — SIGNIFICANT CHANGE UP (ref 150–400)
PLATELET # BLD AUTO: 183 K/UL — SIGNIFICANT CHANGE UP (ref 150–400)
POTASSIUM SERPL-MCNC: 4 MMOL/L — SIGNIFICANT CHANGE UP (ref 3.5–5.3)
POTASSIUM SERPL-MCNC: 4.6 MMOL/L — SIGNIFICANT CHANGE UP (ref 3.5–5.3)
POTASSIUM SERPL-SCNC: 4 MMOL/L — SIGNIFICANT CHANGE UP (ref 3.5–5.3)
POTASSIUM SERPL-SCNC: 4.6 MMOL/L — SIGNIFICANT CHANGE UP (ref 3.5–5.3)
PROT UR-MCNC: NEGATIVE MG/DL — SIGNIFICANT CHANGE UP
RBC # BLD: 3.23 M/UL — LOW (ref 4.2–5.8)
RBC # BLD: 3.37 M/UL — LOW (ref 4.2–5.8)
RBC # FLD: 13.7 % — SIGNIFICANT CHANGE UP (ref 10.3–16.9)
RBC # FLD: 13.7 % — SIGNIFICANT CHANGE UP (ref 10.3–16.9)
SODIUM SERPL-SCNC: 132 MMOL/L — LOW (ref 135–145)
SODIUM SERPL-SCNC: 135 MMOL/L — SIGNIFICANT CHANGE UP (ref 135–145)
SP GR SPEC: <=1.005 — SIGNIFICANT CHANGE UP (ref 1–1.03)
UROBILINOGEN FLD QL: 0.2 E.U./DL — SIGNIFICANT CHANGE UP
WBC # BLD: 11.9 K/UL — HIGH (ref 3.8–10.5)
WBC # BLD: 12.6 K/UL — HIGH (ref 3.8–10.5)
WBC # FLD AUTO: 11.9 K/UL — HIGH (ref 3.8–10.5)
WBC # FLD AUTO: 12.6 K/UL — HIGH (ref 3.8–10.5)

## 2017-06-02 PROCEDURE — 71010: CPT | Mod: 26,76

## 2017-06-02 RX ORDER — ACETAMINOPHEN 500 MG
650 TABLET ORAL ONCE
Qty: 0 | Refills: 0 | Status: COMPLETED | OUTPATIENT
Start: 2017-06-02 | End: 2017-06-02

## 2017-06-02 RX ORDER — POLYETHYLENE GLYCOL 3350 17 G/17G
17 POWDER, FOR SOLUTION ORAL AT BEDTIME
Qty: 0 | Refills: 0 | Status: DISCONTINUED | OUTPATIENT
Start: 2017-06-02 | End: 2017-06-03

## 2017-06-02 RX ORDER — MAGNESIUM OXIDE 400 MG ORAL TABLET 241.3 MG
400 TABLET ORAL ONCE
Qty: 0 | Refills: 0 | Status: COMPLETED | OUTPATIENT
Start: 2017-06-02 | End: 2017-06-02

## 2017-06-02 RX ORDER — MAGNESIUM OXIDE 400 MG ORAL TABLET 241.3 MG
800 TABLET ORAL ONCE
Qty: 0 | Refills: 0 | Status: COMPLETED | OUTPATIENT
Start: 2017-06-02 | End: 2017-06-02

## 2017-06-02 RX ORDER — DOCUSATE SODIUM 100 MG
100 CAPSULE ORAL THREE TIMES A DAY
Qty: 0 | Refills: 0 | Status: DISCONTINUED | OUTPATIENT
Start: 2017-06-02 | End: 2017-06-03

## 2017-06-02 RX ADMIN — Medication 100 MILLIGRAM(S): at 21:21

## 2017-06-02 RX ADMIN — Medication 650 MILLIGRAM(S): at 13:50

## 2017-06-02 RX ADMIN — HEPARIN SODIUM 5000 UNIT(S): 5000 INJECTION INTRAVENOUS; SUBCUTANEOUS at 15:49

## 2017-06-02 RX ADMIN — HEPARIN SODIUM 5000 UNIT(S): 5000 INJECTION INTRAVENOUS; SUBCUTANEOUS at 21:21

## 2017-06-02 RX ADMIN — CLOPIDOGREL BISULFATE 75 MILLIGRAM(S): 75 TABLET, FILM COATED ORAL at 12:49

## 2017-06-02 RX ADMIN — Medication 650 MILLIGRAM(S): at 00:03

## 2017-06-02 RX ADMIN — Medication 650 MILLIGRAM(S): at 05:00

## 2017-06-02 RX ADMIN — Medication 650 MILLIGRAM(S): at 05:31

## 2017-06-02 RX ADMIN — Medication 650 MILLIGRAM(S): at 22:19

## 2017-06-02 RX ADMIN — Medication 81 MILLIGRAM(S): at 12:48

## 2017-06-02 RX ADMIN — Medication 650 MILLIGRAM(S): at 16:16

## 2017-06-02 RX ADMIN — HEPARIN SODIUM 5000 UNIT(S): 5000 INJECTION INTRAVENOUS; SUBCUTANEOUS at 06:29

## 2017-06-02 RX ADMIN — Medication 650 MILLIGRAM(S): at 12:50

## 2017-06-02 RX ADMIN — FAMOTIDINE 20 MILLIGRAM(S): 10 INJECTION INTRAVENOUS at 12:49

## 2017-06-02 RX ADMIN — MAGNESIUM OXIDE 400 MG ORAL TABLET 400 MILLIGRAM(S): 241.3 TABLET ORAL at 09:48

## 2017-06-02 RX ADMIN — ATORVASTATIN CALCIUM 20 MILLIGRAM(S): 80 TABLET, FILM COATED ORAL at 21:21

## 2017-06-02 RX ADMIN — Medication 100 MILLIGRAM(S): at 05:28

## 2017-06-02 RX ADMIN — Medication 650 MILLIGRAM(S): at 23:00

## 2017-06-02 RX ADMIN — MAGNESIUM OXIDE 400 MG ORAL TABLET 800 MILLIGRAM(S): 241.3 TABLET ORAL at 16:16

## 2017-06-02 NOTE — PROGRESS NOTE ADULT - SUBJECTIVE AND OBJECTIVE BOX
Patient discussed on morning rounds with Dr. Luo    Operation / Date: 5/31:Robot-assisted, MIDCAB (LIMA to LAD)     SUBJECTIVE ASSESSMENT:  84y Male reports feeling well this AM. Does endorse incisional pain but states that its well controlled when he get pain medication. Denies feeling dizzy or lightheaded. States that he walked two times this morning already and plans to walk more.         Vital Signs Last 24 Hrs  T(C): 36.9, Max: 37.5 (06-02 @ 05:27)  T(F): 98.5, Max: 99.5 (06-02 @ 05:27)  HR: 71 (56 - 71)  BP: 124/58 (107/53 - 127/59)  BP(mean): 83 (76 - 85)  RR: 16 (16 - 16)  SpO2: 91% (91% - 96%)  I&O's Detail  I & Os for 24h ending 02 Jun 2017 07:00  =============================================  IN:    lactated ringers.: 1200 ml    Oral Fluid: 580 ml    Albumin 5%  - 250 mL: 500 ml    Lactated Ringers IV Bolus: 500 ml    Solution: 187.5 ml    Solution: 127.4 ml    IV PiggyBack: 100 ml    sodium chloride 0.45%.: 20 ml    Total IN: 3214.9 ml  ---------------------------------------------  OUT:    Voided: 1170 ml    Chest Tube: 150 ml    Indwelling Catheter - Urethral: 50 ml    Total OUT: 1370 ml  ---------------------------------------------  Total NET: 1844.9 ml    I & Os for current day (as of 02 Jun 2017 14:54)  =============================================  IN:    lactated ringers.: 300 ml    Oral Fluid: 240 ml    Total IN: 540 ml  ---------------------------------------------  OUT:    Voided: 250 ml    Total OUT: 250 ml  ---------------------------------------------  Total NET: 290 ml      CHEST TUBE:  /No..   OMAR DRAIN:  No.  EPICARDIAL WIRES: No.  TIE DOWNS: Yes.  SHELDON: No.    PHYSICAL EXAM:    General: AOx3 in no acute distress    Neurological: No focal neuro deficit.     Cardiovascular: bradycardic no murmurs rubs or gallops. Zoll pads in place     Respiratory: +wheeze with decreased lung sounds at bilateral bases. No rales or rhonchi    Gastrointestinal: soft non tender non distended.     Extremities: WWP 1+ pitting edema bilaterally.     Vascular: 2+ distal pulses bilaterally    Incision Sites: left thoracotomy clean dry intact no crepitus. TTP     LABS:                        9.9    11.9  )-----------( 183      ( 02 Jun 2017 13:18 )             28.2           PT/INR - ( 01 Jun 2017 01:25 )   PT: 13.9 sec;   INR: 1.25          PTT - ( 01 Jun 2017 01:25 )  PTT:26.4 sec    06-02    132<L>  |  100  |  21  ----------------------------<  94  4.0   |  22  |  1.30    Ca    7.8<L>      02 Jun 2017 13:17  Phos  2.9     06-02  Mg     1.6     06-02    TPro  5.4<L>  /  Alb  3.5  /  TBili  0.7  /  DBili  x   /  AST  23  /  ALT  16  /  AlkPhos  68  06-01          MEDICATIONS  (STANDING):  aspirin enteric coated 81milliGRAM(s) Oral daily  clopidogrel Tablet 75milliGRAM(s) Oral daily  sodium chloride 0.45%. 1000milliLiter(s) IV Continuous <Continuous>  heparin  Injectable 5000Unit(s) SubCutaneous every 8 hours  atorvastatin 20milliGRAM(s) Oral at bedtime  famotidine    Tablet 20milliGRAM(s) Oral daily  insulin lispro (HumaLOG) corrective regimen sliding scale  SubCutaneous Before meals and at bedtime  dextrose 5%. 1000milliLiter(s) IV Continuous <Continuous>  dextrose 50% Injectable 12.5Gram(s) IV Push once  dextrose 50% Injectable 25Gram(s) IV Push once  dextrose 50% Injectable 25Gram(s) IV Push once  albumin human  5% IVPB 250milliLiter(s) IV Intermittent every 10 minutes  lactated ringers. 1000milliLiter(s) IV Continuous <Continuous>  magnesium oxide 400milliGRAM(s) Oral once  magnesium oxide 800milliGRAM(s) Oral once    MEDICATIONS  (PRN):  dextrose Gel 1Dose(s) Oral once PRN Blood Glucose LESS THAN 70 milliGRAM(s)/deciliter  glucagon  Injectable 1milliGRAM(s) IntraMuscular once PRN Glucose LESS THAN 70 milligrams/deciliter  acetaminophen   Tablet. 650milliGRAM(s) Oral every 6 hours PRN Moderate Pain (4 - 6)  oxyCODONE  5 mG/acetaminophen 325 mG 1Tablet(s) Oral every 6 hours PRN Severe Pain (7 - 10)  benzocaine 15 mG/menthol 3.6 mG Lozenge 1Lozenge Oral every 1 hour PRN Sore Throat        RADIOLOGY & ADDITIONAL TESTS:    Retrocardiac consolidation and left pleural effusion. Mild pulmonary   venous congestion.    New subcutaneous emphysema in the left lateral chest wall. Correlation   with physical exam and follow-up chest x-rays is advised.

## 2017-06-02 NOTE — DISCHARGE NOTE ADULT - MEDICATION SUMMARY - MEDICATIONS TO TAKE
I will START or STAY ON the medications listed below when I get home from the hospital:    Aspirin Enteric Coated 81 mg oral delayed release tablet  -- 1 tab(s) by mouth once a day  -- Indication: For antiplatelet    acetaminophen-oxycodone 325 mg-5 mg oral tablet  -- 1 tab(s) by mouth every 8 hours, As Needed, Severe Pain (7 - 10) MDD:no more than 4 tabs  -- Indication: For pain control    Lipitor 20 mg oral tablet  -- 1 tab(s) by mouth once a day (at bedtime)  -- Indication: For antidyslipidemia    Plavix 75 mg oral tablet  -- 1 tab(s) by mouth once a day  -- Indication: For antiplatelet    polyethylene glycol 3350 oral powder for reconstitution  -- 17 gram(s) by mouth once a day (at bedtime).  OTC  -- Indication: For GI PPX    pantoprazole 20 mg oral delayed release tablet  -- 1 tab(s) by mouth once a day  -- Indication: For GI PPX

## 2017-06-02 NOTE — DISCHARGE NOTE ADULT - CARE PROVIDER_API CALL
Jez Luo (MD), Cardiovascular Surgery  [address] [place] [address]  Phone: [phone]  Fax: [phone]

## 2017-06-02 NOTE — DISCHARGE NOTE ADULT - HOSPITAL COURSE
This 84 year-old male with PMH of HTN, HLD, prostate CA s/p radiation, CAD with s/p PCI (2016) to OM1 and PTCA to mid LAD presents to this admission for surgical evaluation for his progressing worsening CAD associated with angina symptoms.  Patient underwent Minimally invasive coronary artery bypass.  Postoperatively, he was extubated without difficulty.  During hospitalization, he presented with acute renal injury and that has improved with time.  Some atelectasis was appreciated; ultra sound confirm with minimal effusion.  Increase pulmonary toilet was encouraged.  On POD#3, patient is hemodynamically stable.  He is not on beta blocker due to bradycardiac episodes requiring further evaluation as an outpatient.  Patient is discharged to home for further care

## 2017-06-02 NOTE — PROGRESS NOTE ADULT - ASSESSMENT
85 y/o male, former smoker, PMHx HTN, HLD, prostate ca s/p radiation, CAD last PCI 2016 NARESH OM1 & PTCA midLAD, presented to [facility] ED on 5/24/17, referred from cardiologist's office for evaluation of epigastric discomfort.  He was taken for diagnostic cath which revealed 70% mLAD desion, with 50% ISR of OM1. Dr. Luo was consulted. Patient is now s/p MIDCAB (LIMA to LAD).     Neuro: pain management. Currently well controlled  - continue Tylenol and percocet for pain    CVS: History of CAD, HTN, HLD now s/p MIDCAB   - Continue ASA, plavix and Atorvastatin   - 83 y/o male, former smoker, PMHx HTN, HLD, prostate ca s/p radiation, CAD last PCI 2016 NARESH OM1 & PTCA midLAD, presented to [facility] ED on 5/24/17, referred from cardiologist's office for evaluation of epigastric discomfort.  He was taken for diagnostic cath which revealed 70% mLAD desion, with 50% ISR of OM1. Dr. Luo was consulted. Patient is now s/p MIDCAB (LIMA to LAD).     Neuro: pain management. Currently well controlled  - continue Tylenol and percocet for pain    CVS: History of CAD, HTN, HLD now s/p MIDCAB   - Continue ASA, plavix and Atorvastatin   - Holding BB for bradycardia.     Respiratory: Febrile to 100.9. Left sided effusion ultrasounded found to be atelectasis.   - Chest Xray stable.   - Fever likely due to atelectasis. Patient must walk and use IS only pulling 750 right now    GI: No active issues. Had bowel movement this morning.   - Continue Pepcid.   - Started on 83 y/o male, former smoker, PMHx HTN, HLD, prostate ca s/p radiation, CAD last PCI 2016 NARESH OM1 & PTCA midLAD, presented to [facility] ED on 5/24/17, referred from cardiologist's office for evaluation of epigastric discomfort.  He was taken for diagnostic cath which revealed 70% mLAD desion, with 50% ISR of OM1. Dr. Luo was consulted. Patient is now s/p MIDCAB (LIMA to LAD).     Neuro: pain management. Currently well controlled  - continue Tylenol and percocet for pain    CVS: History of CAD, HTN, HLD now s/p MIDCAB   - Continue ASA, plavix and Atorvastatin   - Holding BB for bradycardia.     Respiratory: Febrile to 100.9. Left sided effusion ultrasounded found to be atelectasis.   - Chest Xray stable.   - Fever likely due to atelectasis. Patient must walk and use IS only pulling 750 right now    GI: No active issues. Had bowel movement this morning.   - Continue Pepcid.   - Started on bowel regimen     : Creatinine 1.7 this morning with improvement to 1.3 with fluids.   - Continue to monitor I/Os  - Continue to trend Creatinine .    Endo: HA1C 5.5 TSH low. but T3 and T4 normal   o  H/H: 9.9/ 28.2 stable   - WBC elevated but trending down     ID: Febrile to 100.9 likely atelectasis.   - Given Tylenol.   - Follow up Repeat chest Xray and UA.   - Monitor for SIRs and Sepsis.     Shanika Naranjo Pa-c

## 2017-06-02 NOTE — DISCHARGE NOTE ADULT - PATIENT PORTAL LINK FT
“You can access the [facility] Patient Portal, offered by [facility], by registering with the following website: [email or web address]”

## 2017-06-02 NOTE — DISCHARGE NOTE ADULT - MEDICATION SUMMARY - MEDICATIONS TO STOP TAKING
I will STOP taking the medications listed below when I get home from the hospital:    atenolol 25 mg oral tablet  -- 1 tab(s) by mouth once a day    Norvasc 10 mg oral tablet  -- 1 tab(s) by mouth once a day    Ranexa 500 mg oral tablet, extended release  -- 1 tab(s) by mouth 2 times a day

## 2017-06-02 NOTE — DISCHARGE NOTE ADULT - PLAN OF CARE
s/p MICAB follow up with Dr. Luo on 06/13/17.  please call to confirm the appointment  Patient is not on beta blocker post op due to bradycardiac syndrome.  He is on atenolol at home; may need to re-eval in the office. continue current care low sodium diet

## 2017-06-02 NOTE — DISCHARGE NOTE ADULT - ADDITIONAL INSTRUCTIONS
-Please follow up with Dr. Luo on 06/13/17.  The office is located at [facility], [facility], 4th floor. Call us with any questions #[phone].    -Walk daily as tolerated and use your incentive spirometer every hour.    -No driving or strenuous activity/exercise for 6 weeks, or until cleared by your surgeon.    -Gently clean your incisions with anti-bacterial soap and water, pat dry.  You may leave them open to air.    -Call your doctor if you have shortness of breath, chest pain not relieved by pain medication, dizziness, fever >101.5, or increased redness or drainage from incisions.

## 2017-06-02 NOTE — DISCHARGE NOTE ADULT - CARE PLAN
Principal Discharge DX:	CAD (coronary artery disease)  Goal:	s/p MICAB  Instructions for follow-up, activity and diet:	follow up with Dr. Luo on 06/13/17.  please call to confirm the appointment  Patient is not on beta blocker post op due to bradycardiac syndrome.  He is on atenolol at home; may need to re-eval in the office.  Secondary Diagnosis:	HTN (hypertension)  Goal:	continue current care  Instructions for follow-up, activity and diet:	low sodium diet  Secondary Diagnosis:	HLD (hyperlipidemia)  Goal:	continue current care  Secondary Diagnosis:	Myocardial infarction  Goal:	continue current care

## 2017-06-03 VITALS
DIASTOLIC BLOOD PRESSURE: 64 MMHG | OXYGEN SATURATION: 95 % | RESPIRATION RATE: 16 BRPM | HEART RATE: 68 BPM | SYSTOLIC BLOOD PRESSURE: 143 MMHG

## 2017-06-03 LAB
ANION GAP SERPL CALC-SCNC: 2 MMOL/L — LOW (ref 5–17)
APTT BLD: 31.4 SEC — SIGNIFICANT CHANGE UP (ref 27.5–37.4)
BUN SERPL-MCNC: 20 MG/DL — SIGNIFICANT CHANGE UP (ref 7–23)
CALCIUM SERPL-MCNC: 8.8 MG/DL — SIGNIFICANT CHANGE UP (ref 8.4–10.5)
CHLORIDE SERPL-SCNC: 106 MMOL/L — SIGNIFICANT CHANGE UP (ref 96–108)
CO2 SERPL-SCNC: 28 MMOL/L — SIGNIFICANT CHANGE UP (ref 22–31)
CREAT SERPL-MCNC: 1.4 MG/DL — HIGH (ref 0.5–1.3)
GLUCOSE SERPL-MCNC: 100 MG/DL — HIGH (ref 70–99)
HCT VFR BLD CALC: 32 % — LOW (ref 39–50)
HGB BLD-MCNC: 10.8 G/DL — LOW (ref 13–17)
INR BLD: 1.14 — SIGNIFICANT CHANGE UP (ref 0.88–1.16)
MAGNESIUM SERPL-MCNC: 2.2 MG/DL — SIGNIFICANT CHANGE UP (ref 1.6–2.6)
MCHC RBC-ENTMCNC: 29.8 PG — SIGNIFICANT CHANGE UP (ref 27–34)
MCHC RBC-ENTMCNC: 33.8 G/DL — SIGNIFICANT CHANGE UP (ref 32–36)
MCV RBC AUTO: 88.2 FL — SIGNIFICANT CHANGE UP (ref 80–100)
PHOSPHATE SERPL-MCNC: 2.2 MG/DL — LOW (ref 2.5–4.5)
PLATELET # BLD AUTO: 195 K/UL — SIGNIFICANT CHANGE UP (ref 150–400)
POTASSIUM SERPL-MCNC: 4.9 MMOL/L — SIGNIFICANT CHANGE UP (ref 3.5–5.3)
POTASSIUM SERPL-SCNC: 4.9 MMOL/L — SIGNIFICANT CHANGE UP (ref 3.5–5.3)
PROTHROM AB SERPL-ACNC: 12.7 SEC — SIGNIFICANT CHANGE UP (ref 9.8–12.7)
RBC # BLD: 3.63 M/UL — LOW (ref 4.2–5.8)
RBC # FLD: 13.2 % — SIGNIFICANT CHANGE UP (ref 10.3–16.9)
SODIUM SERPL-SCNC: 136 MMOL/L — SIGNIFICANT CHANGE UP (ref 135–145)
T4 AB SER-ACNC: 5.04 UG/DL — SIGNIFICANT CHANGE UP (ref 3.17–11.72)
WBC # BLD: 8.9 K/UL — SIGNIFICANT CHANGE UP (ref 3.8–10.5)
WBC # FLD AUTO: 8.9 K/UL — SIGNIFICANT CHANGE UP (ref 3.8–10.5)

## 2017-06-03 PROCEDURE — 82803 BLOOD GASES ANY COMBINATION: CPT

## 2017-06-03 PROCEDURE — 81001 URINALYSIS AUTO W/SCOPE: CPT

## 2017-06-03 PROCEDURE — S2900: CPT

## 2017-06-03 PROCEDURE — 86923 COMPATIBILITY TEST ELECTRIC: CPT

## 2017-06-03 PROCEDURE — 86900 BLOOD TYPING SEROLOGIC ABO: CPT

## 2017-06-03 PROCEDURE — P9045: CPT

## 2017-06-03 PROCEDURE — 84436 ASSAY OF TOTAL THYROXINE: CPT

## 2017-06-03 PROCEDURE — 80053 COMPREHEN METABOLIC PANEL: CPT

## 2017-06-03 PROCEDURE — 85730 THROMBOPLASTIN TIME PARTIAL: CPT

## 2017-06-03 PROCEDURE — 86901 BLOOD TYPING SEROLOGIC RH(D): CPT

## 2017-06-03 PROCEDURE — 84480 ASSAY TRIIODOTHYRONINE (T3): CPT

## 2017-06-03 PROCEDURE — 84295 ASSAY OF SERUM SODIUM: CPT

## 2017-06-03 PROCEDURE — 84100 ASSAY OF PHOSPHORUS: CPT

## 2017-06-03 PROCEDURE — 82330 ASSAY OF CALCIUM: CPT

## 2017-06-03 PROCEDURE — 85027 COMPLETE CBC AUTOMATED: CPT

## 2017-06-03 PROCEDURE — 97162 PT EVAL MOD COMPLEX 30 MIN: CPT

## 2017-06-03 PROCEDURE — 36415 COLL VENOUS BLD VENIPUNCTURE: CPT

## 2017-06-03 PROCEDURE — 80048 BASIC METABOLIC PNL TOTAL CA: CPT

## 2017-06-03 PROCEDURE — 86850 RBC ANTIBODY SCREEN: CPT

## 2017-06-03 PROCEDURE — 85610 PROTHROMBIN TIME: CPT

## 2017-06-03 PROCEDURE — 85025 COMPLETE CBC W/AUTO DIFF WBC: CPT

## 2017-06-03 PROCEDURE — 85018 HEMOGLOBIN: CPT

## 2017-06-03 PROCEDURE — 71045 X-RAY EXAM CHEST 1 VIEW: CPT

## 2017-06-03 PROCEDURE — 83735 ASSAY OF MAGNESIUM: CPT

## 2017-06-03 PROCEDURE — 84132 ASSAY OF SERUM POTASSIUM: CPT

## 2017-06-03 PROCEDURE — 71010: CPT | Mod: 26

## 2017-06-03 PROCEDURE — 93005 ELECTROCARDIOGRAM TRACING: CPT

## 2017-06-03 RX ORDER — PANTOPRAZOLE SODIUM 20 MG/1
1 TABLET, DELAYED RELEASE ORAL
Qty: 30 | Refills: 0 | OUTPATIENT
Start: 2017-06-03 | End: 2017-07-03

## 2017-06-03 RX ORDER — POLYETHYLENE GLYCOL 3350 17 G/17G
17 POWDER, FOR SOLUTION ORAL
Qty: 510 | Refills: 0 | OUTPATIENT
Start: 2017-06-03 | End: 2017-07-03

## 2017-06-03 RX ORDER — ASPIRIN/CALCIUM CARB/MAGNESIUM 324 MG
1 TABLET ORAL
Qty: 30 | Refills: 0 | OUTPATIENT
Start: 2017-06-03 | End: 2017-07-03

## 2017-06-03 RX ORDER — ATORVASTATIN CALCIUM 80 MG/1
1 TABLET, FILM COATED ORAL
Qty: 30 | Refills: 0 | OUTPATIENT
Start: 2017-06-03 | End: 2017-07-03

## 2017-06-03 RX ORDER — CLOPIDOGREL BISULFATE 75 MG/1
1 TABLET, FILM COATED ORAL
Qty: 0 | Refills: 0 | COMMUNITY

## 2017-06-03 RX ORDER — CLOPIDOGREL BISULFATE 75 MG/1
1 TABLET, FILM COATED ORAL
Qty: 30 | Refills: 0 | OUTPATIENT
Start: 2017-06-03 | End: 2017-07-03

## 2017-06-03 RX ORDER — OXYCODONE HYDROCHLORIDE 5 MG/1
1 TABLET ORAL
Qty: 42 | Refills: 0 | OUTPATIENT
Start: 2017-06-03 | End: 2017-06-17

## 2017-06-03 RX ORDER — RANOLAZINE 500 MG/1
1 TABLET, FILM COATED, EXTENDED RELEASE ORAL
Qty: 0 | Refills: 0 | COMMUNITY

## 2017-06-03 RX ADMIN — Medication 81 MILLIGRAM(S): at 09:19

## 2017-06-03 RX ADMIN — FAMOTIDINE 20 MILLIGRAM(S): 10 INJECTION INTRAVENOUS at 09:19

## 2017-06-03 RX ADMIN — Medication 100 MILLIGRAM(S): at 05:28

## 2017-06-03 RX ADMIN — Medication 650 MILLIGRAM(S): at 09:19

## 2017-06-03 RX ADMIN — HEPARIN SODIUM 5000 UNIT(S): 5000 INJECTION INTRAVENOUS; SUBCUTANEOUS at 05:28

## 2017-06-03 RX ADMIN — CLOPIDOGREL BISULFATE 75 MILLIGRAM(S): 75 TABLET, FILM COATED ORAL at 09:19

## 2017-06-03 NOTE — PROGRESS NOTE ADULT - SUBJECTIVE AND OBJECTIVE BOX
Patient discussed on morning rounds with Dr. Handley     Operation / Date: 5/31/19 EDY    Surgeon: Valerio    Referring Physician:    SUBJECTIVE ASSESSMENT:  84y Male     Hospital Course:    Vital Signs Last 24 Hrs  T(C): 37.6, Max: 38 (06-02 @ 21:40)  T(F): 99.6, Max: 100.4 (06-02 @ 21:40)  HR: 68 (64 - 73)  BP: 143/64 (143/64 - 170/74)  BP(mean): 92 (92 - 107)  RR: 16 (16 - 19)  SpO2: 95% (95% - 97%)  I&O's Detail    I & Os for current day (as of 03 Jun 2017 20:32)  =============================================  IN:    Oral Fluid: 2480 ml    lactated ringers.: 500 ml    Total IN: 2980 ml  ---------------------------------------------  OUT:    Voided: 2100 ml    Total OUT: 2100 ml  ---------------------------------------------  Total NET: 880 ml      EPICARDIAL WIRES REMOVED: Yes/No.  TIE DOWNS REMOVED: Yes/No.    PHYSICAL EXAM:    General:     Neurological:    Cardiovascular:    Respiratory:    Gastrointestinal:    Extremities:    Vascular:    Incision Sites:    LABS:                        10.8   8.9   )-----------( 195      ( 03 Jun 2017 06:22 )             32.0       PT/INR - ( 03 Jun 2017 06:22 )   PT: 12.7 sec;   INR: 1.14     PTT - ( 03 Jun 2017 06:22 )  PTT:31.4 sec    06-03    136  |  106  |  20  ----------------------------<  100<H>  4.9   |  28  |  1.40<H>    Ca    8.8      03 Jun 2017 06:21  Phos  2.2     06-03  Mg     2.2     06-03    Urinalysis Basic - ( 02 Jun 2017 17:07 )    Color: Yue / Appearance: Clear / SG: <=1.005 / pH: x  Gluc: x / Ketone: NEGATIVE  / Bili: NEGATIVE / Urobili: 0.2 E.U./dL   Blood: x / Protein: NEGATIVE mg/dL / Nitrite: NEGATIVE   Leuk Esterase: NEGATIVE / RBC: < 5 /HPF / WBC < 5 /HPF   Sq Epi: x / Non Sq Epi: Rare /HPF / Bacteria: Present /HPF        MEDICATIONS (discharge)        Discharge CXR:    Discharge ECHO: Patient discussed on morning rounds with Dr. Handley     Operation / Date: 5/31/19 EDY    Surgeon: Valerio    Referring Physician:    SUBJECTIVE ASSESSMENT:  84y Male     Hospital Course:    Vital Signs Last 24 Hrs  T(C): 37.6, Max: 38 (06-02 @ 21:40)  T(F): 99.6, Max: 100.4 (06-02 @ 21:40)  HR: 68 (64 - 73)  BP: 143/64 (143/64 - 170/74)  BP(mean): 92 (92 - 107)  RR: 16 (16 - 19)  SpO2: 95% (95% - 97%)  I&O's Detail    I & Os for current day (as of 03 Jun 2017 20:32)  =============================================  IN:    Oral Fluid: 2480 ml    lactated ringers.: 500 ml    Total IN: 2980 ml  ---------------------------------------------  OUT:    Voided: 2100 ml    Total OUT: 2100 ml  ---------------------------------------------  Total NET: 880 ml      EPICARDIAL WIRES REMOVED: Yes/No.  TIE DOWNS REMOVED: Yes/No.    PHYSICAL EXAM:    General:     Neurological:    Cardiovascular:    Respiratory:    Gastrointestinal:    Extremities:    Vascular:    Incision Sites:    LABS:                        10.8   8.9   )-----------( 195      ( 03 Jun 2017 06:22 )             32.0       PT/INR - ( 03 Jun 2017 06:22 )   PT: 12.7 sec;   INR: 1.14     PTT - ( 03 Jun 2017 06:22 )  PTT:31.4 sec    06-03    136  |  106  |  20  ----------------------------<  100<H>  4.9   |  28  |  1.40<H>    Ca    8.8      03 Jun 2017 06:21  Phos  2.2     06-03  Mg     2.2     06-03    Urinalysis Basic - ( 02 Jun 2017 17:07 )    Color: Yue / Appearance: Clear / SG: <=1.005 / pH: x  Gluc: x / Ketone: NEGATIVE  / Bili: NEGATIVE / Urobili: 0.2 E.U./dL   Blood: x / Protein: NEGATIVE mg/dL / Nitrite: NEGATIVE   Leuk Esterase: NEGATIVE / RBC: < 5 /HPF / WBC < 5 /HPF   Sq Epi: x / Non Sq Epi: Rare /HPF / Bacteria: Present /HPF        MEDICATIONS (discharge)  Aspirin Enteric Coated 81 mg oral delayed release tablet  -- 1 tab(s) by mouth once a day    acetaminophen-oxycodone 325 mg-5 mg oral tablet  -- 1 tab(s) by mouth every 8 hours, As Needed, Severe Pain (7 - 10) MDD:no more than 4 tabs    Lipitor 20 mg oral tablet  -- 1 tab(s) by mouth once a day (at bedtime)    Plavix 75 mg oral tablet  -- 1 tab(s) by mouth once a day    polyethylene glycol 3350 oral powder for reconstitution  -- 17 gram(s) by mouth once a day (at bedtime).  OTC    pantoprazole 20 mg oral delayed release tablet  -- 1 tab(s) by mouth once a day           Discharge CXR:  Portable examination the chest demonstrates no interval change lung   pathology in comparison to prior examination of the chest 6/2/2017. Left   effusion. Underlying infiltrates cannot be excluded.    Discharge ECHO:  no repeat echo

## 2017-06-04 DIAGNOSIS — E78.5 HYPERLIPIDEMIA, UNSPECIFIED: ICD-10-CM

## 2017-06-04 DIAGNOSIS — I10 ESSENTIAL (PRIMARY) HYPERTENSION: ICD-10-CM

## 2017-06-04 DIAGNOSIS — I21.3 ST ELEVATION (STEMI) MYOCARDIAL INFARCTION OF UNSPECIFIED SITE: ICD-10-CM

## 2017-06-04 DIAGNOSIS — I25.10 ATHEROSCLEROTIC HEART DISEASE OF NATIVE CORONARY ARTERY WITHOUT ANGINA PECTORIS: ICD-10-CM

## 2017-06-04 LAB — T3 SERPL-MCNC: 78 NG/DL — LOW (ref 80–200)

## 2017-06-06 DIAGNOSIS — H54.41 BLINDNESS, RIGHT EYE, NORMAL VISION LEFT EYE: ICD-10-CM

## 2017-06-06 DIAGNOSIS — Z82.49 FAMILY HISTORY OF ISCHEMIC HEART DISEASE AND OTHER DISEASES OF THE CIRCULATORY SYSTEM: ICD-10-CM

## 2017-06-06 DIAGNOSIS — Z85.46 PERSONAL HISTORY OF MALIGNANT NEOPLASM OF PROSTATE: ICD-10-CM

## 2017-06-06 DIAGNOSIS — Z87.891 PERSONAL HISTORY OF NICOTINE DEPENDENCE: ICD-10-CM

## 2017-06-06 DIAGNOSIS — I50.30 UNSPECIFIED DIASTOLIC (CONGESTIVE) HEART FAILURE: ICD-10-CM

## 2017-06-06 DIAGNOSIS — Z92.3 PERSONAL HISTORY OF IRRADIATION: ICD-10-CM

## 2017-06-06 DIAGNOSIS — N17.9 ACUTE KIDNEY FAILURE, UNSPECIFIED: ICD-10-CM

## 2017-06-06 DIAGNOSIS — E78.5 HYPERLIPIDEMIA, UNSPECIFIED: ICD-10-CM

## 2017-06-06 DIAGNOSIS — I25.10 ATHEROSCLEROTIC HEART DISEASE OF NATIVE CORONARY ARTERY WITHOUT ANGINA PECTORIS: ICD-10-CM

## 2017-06-06 DIAGNOSIS — I25.2 OLD MYOCARDIAL INFARCTION: ICD-10-CM

## 2017-06-06 DIAGNOSIS — J98.11 ATELECTASIS: ICD-10-CM

## 2017-06-06 DIAGNOSIS — I11.0 HYPERTENSIVE HEART DISEASE WITH HEART FAILURE: ICD-10-CM

## 2017-06-06 DIAGNOSIS — Z98.890 OTHER SPECIFIED POSTPROCEDURAL STATES: ICD-10-CM

## 2017-06-06 DIAGNOSIS — Z79.82 LONG TERM (CURRENT) USE OF ASPIRIN: ICD-10-CM

## 2017-06-06 DIAGNOSIS — Z90.49 ACQUIRED ABSENCE OF OTHER SPECIFIED PARTS OF DIGESTIVE TRACT: ICD-10-CM

## 2017-06-06 DIAGNOSIS — Z95.5 PRESENCE OF CORONARY ANGIOPLASTY IMPLANT AND GRAFT: ICD-10-CM

## 2017-06-06 DIAGNOSIS — R00.1 BRADYCARDIA, UNSPECIFIED: ICD-10-CM

## 2017-06-09 PROBLEM — Z86.39 HISTORY OF HYPERLIPIDEMIA: Status: RESOLVED | Noted: 2017-06-09 | Resolved: 2017-06-09

## 2017-06-09 PROBLEM — Z95.1 S/P CABG X 1: Status: ACTIVE | Noted: 2017-06-09

## 2017-06-09 PROBLEM — Z78.9 PRESENCE OF STENT IN ARTERY: Status: ACTIVE | Noted: 2017-06-09

## 2017-06-09 PROBLEM — H54.41 BLIND RIGHT EYE: Status: RESOLVED | Noted: 2017-06-09 | Resolved: 2017-06-09

## 2017-06-09 PROBLEM — I25.10 CAD (CORONARY ARTERY DISEASE): Status: ACTIVE | Noted: 2017-06-09

## 2017-06-09 PROBLEM — Z09 POSTOP CHECK: Status: ACTIVE | Noted: 2017-06-09

## 2017-06-09 RX ORDER — ACETAMINOPHEN 325 MG/1
325 TABLET ORAL
Refills: 0 | Status: ACTIVE | COMMUNITY

## 2017-06-09 RX ORDER — BENAZEPRIL HYDROCHLORIDE 40 MG/1
40 TABLET, FILM COATED ORAL DAILY
Refills: 0 | Status: COMPLETED | COMMUNITY
End: 2017-06-09

## 2017-06-09 RX ORDER — ATENOLOL 25 MG/1
25 TABLET ORAL DAILY
Refills: 0 | Status: COMPLETED | COMMUNITY
End: 2017-06-09

## 2017-06-09 RX ORDER — RANOLAZINE 500 MG/1
500 TABLET, FILM COATED, EXTENDED RELEASE ORAL
Qty: 60 | Refills: 3 | Status: COMPLETED | COMMUNITY
End: 2017-06-09

## 2017-06-09 RX ORDER — AMLODIPINE BESYLATE 10 MG/1
10 TABLET ORAL DAILY
Refills: 0 | Status: COMPLETED | COMMUNITY
End: 2017-06-09

## 2017-06-13 ENCOUNTER — APPOINTMENT (OUTPATIENT)
Dept: CARDIOTHORACIC SURGERY | Facility: CLINIC | Age: 82
End: 2017-06-13

## 2017-06-13 VITALS
WEIGHT: 170 LBS | OXYGEN SATURATION: 96 % | HEART RATE: 78 BPM | RESPIRATION RATE: 18 BRPM | TEMPERATURE: 97.6 F | DIASTOLIC BLOOD PRESSURE: 80 MMHG | SYSTOLIC BLOOD PRESSURE: 193 MMHG | BODY MASS INDEX: 28.29 KG/M2

## 2017-06-13 VITALS — SYSTOLIC BLOOD PRESSURE: 180 MMHG | DIASTOLIC BLOOD PRESSURE: 85 MMHG

## 2017-06-13 DIAGNOSIS — Z09 ENCOUNTER FOR FOLLOW-UP EXAMINATION AFTER COMPLETED TREATMENT FOR CONDITIONS OTHER THAN MALIGNANT NEOPLASM: ICD-10-CM

## 2017-06-13 DIAGNOSIS — I25.10 ATHEROSCLEROTIC HEART DISEASE OF NATIVE CORONARY ARTERY W/OUT ANGINA PECTORIS: ICD-10-CM

## 2017-06-13 DIAGNOSIS — H54.41 BLINDNESS, RIGHT EYE, NORMAL VISION LEFT EYE: ICD-10-CM

## 2017-06-13 DIAGNOSIS — Z78.9 OTHER SPECIFIED HEALTH STATUS: ICD-10-CM

## 2017-06-13 DIAGNOSIS — Z95.1 PRESENCE OF AORTOCORONARY BYPASS GRAFT: ICD-10-CM

## 2017-06-13 DIAGNOSIS — Z86.39 PERSONAL HISTORY OF OTHER ENDOCRINE, NUTRITIONAL AND METABOLIC DISEASE: ICD-10-CM

## 2017-09-26 ENCOUNTER — APPOINTMENT (OUTPATIENT)
Dept: CARDIOTHORACIC SURGERY | Facility: CLINIC | Age: 82
End: 2017-09-26
Payer: MEDICARE

## 2017-09-26 VITALS
OXYGEN SATURATION: 97 % | DIASTOLIC BLOOD PRESSURE: 70 MMHG | SYSTOLIC BLOOD PRESSURE: 158 MMHG | HEART RATE: 67 BPM | RESPIRATION RATE: 18 BRPM

## 2017-09-26 PROCEDURE — 99213 OFFICE O/P EST LOW 20 MIN: CPT

## 2017-10-02 ENCOUNTER — INPATIENT (INPATIENT)
Facility: HOSPITAL | Age: 82
LOS: 0 days | Discharge: HOME | DRG: 287 | End: 2017-10-03
Attending: INTERNAL MEDICINE | Admitting: INTERNAL MEDICINE
Payer: COMMERCIAL

## 2017-10-02 VITALS
HEART RATE: 68 BPM | SYSTOLIC BLOOD PRESSURE: 169 MMHG | HEIGHT: 65 IN | WEIGHT: 164.91 LBS | RESPIRATION RATE: 17 BRPM | OXYGEN SATURATION: 98 % | DIASTOLIC BLOOD PRESSURE: 69 MMHG | TEMPERATURE: 98 F

## 2017-10-02 DIAGNOSIS — R07.9 CHEST PAIN, UNSPECIFIED: ICD-10-CM

## 2017-10-02 DIAGNOSIS — I10 ESSENTIAL (PRIMARY) HYPERTENSION: ICD-10-CM

## 2017-10-02 LAB
ALBUMIN SERPL ELPH-MCNC: 3.9 G/DL — SIGNIFICANT CHANGE UP (ref 3.3–5)
ALP SERPL-CCNC: 98 U/L — SIGNIFICANT CHANGE UP (ref 40–120)
ALT FLD-CCNC: 23 U/L — SIGNIFICANT CHANGE UP (ref 10–45)
ANION GAP SERPL CALC-SCNC: 13 MMOL/L — SIGNIFICANT CHANGE UP (ref 5–17)
APTT BLD: 29.7 SEC — SIGNIFICANT CHANGE UP (ref 27.5–37.4)
AST SERPL-CCNC: 25 U/L — SIGNIFICANT CHANGE UP (ref 10–40)
BASOPHILS NFR BLD AUTO: 0.4 % — SIGNIFICANT CHANGE UP (ref 0–2)
BILIRUB SERPL-MCNC: 0.3 MG/DL — SIGNIFICANT CHANGE UP (ref 0.2–1.2)
BUN SERPL-MCNC: 23 MG/DL — SIGNIFICANT CHANGE UP (ref 7–23)
CALCIUM SERPL-MCNC: 9.2 MG/DL — SIGNIFICANT CHANGE UP (ref 8.4–10.5)
CHLORIDE SERPL-SCNC: 103 MMOL/L — SIGNIFICANT CHANGE UP (ref 96–108)
CK MB CFR SERPL CALC: 2.2 NG/ML — SIGNIFICANT CHANGE UP (ref 0–6.7)
CK SERPL-CCNC: 69 U/L — SIGNIFICANT CHANGE UP (ref 30–200)
CO2 SERPL-SCNC: 24 MMOL/L — SIGNIFICANT CHANGE UP (ref 22–31)
CREAT SERPL-MCNC: 1.16 MG/DL — SIGNIFICANT CHANGE UP (ref 0.5–1.3)
EOSINOPHIL NFR BLD AUTO: 2.4 % — SIGNIFICANT CHANGE UP (ref 0–6)
EXTRA SST TUBE: SIGNIFICANT CHANGE UP
GLUCOSE SERPL-MCNC: 110 MG/DL — HIGH (ref 70–99)
HCT VFR BLD CALC: 36.7 % — LOW (ref 39–50)
HGB BLD-MCNC: 12.2 G/DL — LOW (ref 13–17)
INR BLD: 1.16 — SIGNIFICANT CHANGE UP (ref 0.88–1.16)
LYMPHOCYTES # BLD AUTO: 18.3 % — SIGNIFICANT CHANGE UP (ref 13–44)
MCHC RBC-ENTMCNC: 28.8 PG — SIGNIFICANT CHANGE UP (ref 27–34)
MCHC RBC-ENTMCNC: 33.2 G/DL — SIGNIFICANT CHANGE UP (ref 32–36)
MCV RBC AUTO: 86.8 FL — SIGNIFICANT CHANGE UP (ref 80–100)
MONOCYTES NFR BLD AUTO: 9 % — SIGNIFICANT CHANGE UP (ref 2–14)
NEUTROPHILS NFR BLD AUTO: 69.9 % — SIGNIFICANT CHANGE UP (ref 43–77)
NT-PROBNP SERPL-SCNC: 689 PG/ML — HIGH (ref 0–300)
PLATELET # BLD AUTO: 393 K/UL — SIGNIFICANT CHANGE UP (ref 150–400)
POTASSIUM SERPL-MCNC: 4.9 MMOL/L — SIGNIFICANT CHANGE UP (ref 3.5–5.3)
POTASSIUM SERPL-SCNC: 4.9 MMOL/L — SIGNIFICANT CHANGE UP (ref 3.5–5.3)
PROT SERPL-MCNC: 7.1 G/DL — SIGNIFICANT CHANGE UP (ref 6–8.3)
PROTHROM AB SERPL-ACNC: 12.9 SEC — HIGH (ref 9.8–12.7)
RBC # BLD: 4.23 M/UL — SIGNIFICANT CHANGE UP (ref 4.2–5.8)
RBC # FLD: 13.9 % — SIGNIFICANT CHANGE UP (ref 10.3–16.9)
SODIUM SERPL-SCNC: 140 MMOL/L — SIGNIFICANT CHANGE UP (ref 135–145)
TROPONIN T SERPL-MCNC: <0.01 NG/ML — SIGNIFICANT CHANGE UP (ref 0–0.01)
WBC # BLD: 8.4 K/UL — SIGNIFICANT CHANGE UP (ref 3.8–10.5)
WBC # FLD AUTO: 8.4 K/UL — SIGNIFICANT CHANGE UP (ref 3.8–10.5)

## 2017-10-02 PROCEDURE — 99285 EMERGENCY DEPT VISIT HI MDM: CPT | Mod: 25

## 2017-10-02 PROCEDURE — 99221 1ST HOSP IP/OBS SF/LOW 40: CPT

## 2017-10-02 PROCEDURE — 71010: CPT | Mod: 26

## 2017-10-02 PROCEDURE — 93010 ELECTROCARDIOGRAM REPORT: CPT

## 2017-10-02 RX ORDER — AMLODIPINE BESYLATE 2.5 MG/1
10 TABLET ORAL DAILY
Qty: 0 | Refills: 0 | Status: DISCONTINUED | OUTPATIENT
Start: 2017-10-02 | End: 2017-10-03

## 2017-10-02 RX ORDER — RANOLAZINE 500 MG/1
500 TABLET, FILM COATED, EXTENDED RELEASE ORAL
Qty: 0 | Refills: 0 | Status: DISCONTINUED | OUTPATIENT
Start: 2017-10-02 | End: 2017-10-03

## 2017-10-02 RX ORDER — ASPIRIN/CALCIUM CARB/MAGNESIUM 324 MG
325 TABLET ORAL DAILY
Qty: 0 | Refills: 0 | Status: DISCONTINUED | OUTPATIENT
Start: 2017-10-02 | End: 2017-10-03

## 2017-10-02 RX ORDER — SODIUM CHLORIDE 9 MG/ML
1000 INJECTION INTRAMUSCULAR; INTRAVENOUS; SUBCUTANEOUS
Qty: 0 | Refills: 0 | Status: DISCONTINUED | OUTPATIENT
Start: 2017-10-03 | End: 2017-10-03

## 2017-10-02 RX ORDER — ACETAMINOPHEN 500 MG
650 TABLET ORAL ONCE
Qty: 0 | Refills: 0 | Status: COMPLETED | OUTPATIENT
Start: 2017-10-02 | End: 2017-10-02

## 2017-10-02 RX ORDER — CLOPIDOGREL BISULFATE 75 MG/1
75 TABLET, FILM COATED ORAL DAILY
Qty: 0 | Refills: 0 | Status: DISCONTINUED | OUTPATIENT
Start: 2017-10-02 | End: 2017-10-03

## 2017-10-02 RX ORDER — ATORVASTATIN CALCIUM 80 MG/1
20 TABLET, FILM COATED ORAL AT BEDTIME
Qty: 0 | Refills: 0 | Status: DISCONTINUED | OUTPATIENT
Start: 2017-10-02 | End: 2017-10-03

## 2017-10-02 RX ORDER — AMLODIPINE BESYLATE 2.5 MG/1
1 TABLET ORAL
Qty: 0 | Refills: 0 | COMMUNITY

## 2017-10-02 RX ORDER — ATENOLOL 25 MG/1
25 TABLET ORAL DAILY
Qty: 0 | Refills: 0 | Status: DISCONTINUED | OUTPATIENT
Start: 2017-10-02 | End: 2017-10-03

## 2017-10-02 RX ADMIN — Medication 650 MILLIGRAM(S): at 18:57

## 2017-10-02 NOTE — CONSULT NOTE ADULT - SUBJECTIVE AND OBJECTIVE BOX
Surgeon: Dr. Luo    Requesting Physician: ED Physician    HISTORY OF PRESENT ILLNESS:  84y Male with history of HTN, HLD, prostate CA s/p radiation therapy, CAD s/p PCI in 2016 to OM1 and PTCA to mLAD who was admitted to [facility] in 6/2017 where he underwent an uncomplicated MIDCAB (LIMA to LAD), discharged on POD 3 after an uncomplicated post-op course.  Post-operatively, he states that he has done well overall but states that he has experienced intermittent 5/10 substernal CP, non-radiating, exacerbated by exertion and anxiety and relieved with rest, lasting <10min with each episode. He was seen in outpatient follow-up    PAST MEDICAL & SURGICAL HISTORY:  Prostate CA  Stented coronary artery  Myocardial infarction  HLD (hyperlipidemia)  HTN (hypertension)  Other postprocedural status: S/P hernia repair  Other acquired absence of organ: S/P cholecystectomy      MEDICATIONS  (STANDING):    MEDICATIONS  (PRN):      Allergies    No Known Allergies    Intolerances        SOCIAL HISTORY:  Smoker:  YES / NO        PACK YEARS:                         WHEN QUIT?  ETOH use:  YES / NO               FREQUENCY / QUANTITY:  Ilicit Drug use:  YES / NO  Occupation:  Assisted device use (Cane / Walker):  Live with:    FAMILY HISTORY:  Family history of MI (myocardial infarction) (Mother)  No pertinent family history: No significant family history      Review of Systems  CONSTITUTIONAL:  Fevers / chills, sweats, fatigue, weight loss, weight gain                                    NEGATIVE  NEURO:  parathesias, seizures, syncope, confusion                                                                               NEGATIVE  EYES:  Blurry vision, discharge, pain, loss of vision                                                                                  NEGATIVE  ENMT:  Difficulty hearing, vertigo, dysphagia, epistaxis, recent dental work                                     NEGATIVE  CV:  Chest pain, palpitations, KAY, orthopnea                                                                                         NEGATIVE  RESPIRATORY:  Wheezing, SOB, cough / sputum, hemoptysis                                                              NEGATIVE  GI:  Nausea, vommiting, diarrhea, constipation, melena                                                                        NEGATIVE  : Hematuria, dysuria, urgency, incontinence                                                                                       NEGATIVE  MUSKULOSKELETAL:  arthritis, joint swelling, muscle weakness                                                           NEGATIVE  SKIN/BREAST:  rash, itching, aspen loss, masses                                                                                            NEGATIVE  PSYCH:  depresion, anxiety, suicidal ideation                                                                                             NEGATIVE  HEME/LYMPH:  bruises easily, enlarged lymph nodes, tender lymph nodes                                        NEGATIVE  ENDOCRINE:  cold intolerance, heat intolerance, polydipsia                                                                   NEGATIVE    PHYSICAL EXAM  Vital Signs Last 24 Hrs  T(C): 36.5 (02 Oct 2017 16:02), Max: 36.5 (02 Oct 2017 16:02)  T(F): 97.7 (02 Oct 2017 16:02), Max: 97.7 (02 Oct 2017 16:02)  HR: 68 (02 Oct 2017 16:02) (68 - 68)  BP: 169/69 (02 Oct 2017 16:02) (169/69 - 169/69)  BP(mean): --  RR: 17 (02 Oct 2017 16:02) (17 - 17)  SpO2: 98% (02 Oct 2017 16:02) (98% - 98%)    CONSTITUTIONAL:                                                                          WNL  NEURO:                                                                                             WNL                      EYES:                                                                                                  WNL  ENMT:                                                                                                WNL  CV:                                                                                                      WNL  RESPIRATORY:                                                                                  WNL  GI:                                                                                                       WNL  : SHELDON + / -                                                                                 WNL  MUSKULOSKELETAL:                                                                       WNL  SKIN / BREAST:                                                                                 WNL  Extremities  Vascular                                                          LABS:                        12.2   8.4   )-----------( 393      ( 02 Oct 2017 16:49 )             36.7                           RADIOLOGY & ADDITIONAL STUDIES:  CAROTID U/S:    CXR:    CT Scan:    EKG:    TTE / LAVELLE:    Cardiac Cath: Surgeon: Dr. Luo    Requesting Physician: ED Physician    HISTORY OF PRESENT ILLNESS:  84y Male with history of HTN, HLD, prostate CA s/p radiation therapy, CAD s/p PCI in 2016 to OM1 and PTCA to mLAD who was admitted to [facility] in 6/2017 where he underwent an uncomplicated MIDCAB (LIMA to LAD), discharged on POD 3 after an uncomplicated post-op course.  Post-operatively, he states that he has done well overall but states that he has experienced intermittent 5/10 substernal CP, non-radiating, exacerbated by exertion and anxiety and relieved with rest, lasting <10min with each episode. He was seen in outpatient follow-up last week by Dr. Luo who recommended the patient to follow-up with his outpatient cardiologist for stress test.  However, the patient states that he refused to undergo a second stress test as he does not trust their results and presented to [facility] ED on 10/2/17 for further evaluation of his intermittent chest pain.  On admission, pt endorses moderate frontal headache, better with tylenol, no active CP, palpitations, SOB at rest, KAY, orthopnea, LE edema, n/v/d, fever.      PAST MEDICAL & SURGICAL HISTORY:  Prostate CA  Stented coronary artery  Myocardial infarction  HLD (hyperlipidemia)  HTN (hypertension)  Other postprocedural status: S/P hernia repair  Other acquired absence of organ: S/P cholecystectomy      MEDICATIONS  (STANDING):    MEDICATIONS  (PRN):    Allergies    No Known Allergies    Intolerances    SOCIAL HISTORY:  Smoker:  Former 30year pack smoker, quit in 6/2017  ETOH use:  NO     Ilicit Drug use:  NO  Occupation: Retired   Assisted device use (Cane / Walker): No  Live with: Wife     FAMILY HISTORY:  Family history of MI (myocardial infarction) (Mother)  No pertinent family history: No significant family history      Review of Systems  CONSTITUTIONAL:  Denies Fevers / chills, sweats, fatigue, weight loss, weight gain                                    NEURO: +Frontal headache, Denies parathesias, seizures, syncope, confusion, changes in vision or other sensory deficits.                                                                          EYES:  Denies Blurry vision, discharge, pain, loss of vision                                                                                 ENMT: Denies Difficulty hearing, vertigo, dysphagia, epistaxis, recent dental work                                       CV:  Denies Chest pain, palpitations, KAY, orthopnea                                                                                           RESPIRATORY: Denies Wheezing, SOB, cough / sputum, hemoptysis                                                                GI:  Denies Nausea, vommiting, diarrhea, constipation, melena                                                                          : Denies Hematuria, dysuria, urgency, incontinence                                                                                         MUSCULOSKELETAL: Denies arthritis, joint swelling, muscle weakness                                                             SKIN/BREAST: Denies rash, itching, aspen loss, masses                                                                                              PSYCH:  Denies depresion, anxiety, suicidal ideation                                                                                               HEME/LYMPH: Denies bruises easily, enlarged lymph nodes, tender lymph nodes                                          ENDOCRINE:  Denies cold intolerance, heat intolerance, polydipsia                                                                       PHYSICAL EXAM  Vital Signs Last 24 Hrs  T(C): 36.5 (02 Oct 2017 16:02), Max: 36.5 (02 Oct 2017 16:02)  T(F): 97.7 (02 Oct 2017 16:02), Max: 97.7 (02 Oct 2017 16:02)  HR: 68 (02 Oct 2017 16:02) (68 - 68)  BP: 169/69 (02 Oct 2017 16:02) (169/69 - 169/69)  BP(mean): --  RR: 17 (02 Oct 2017 16:02) (17 - 17)  SpO2: 98% (02 Oct 2017 16:02) (98% - 98%)    CONSTITUTIONAL: Sitting up in bed, no acute distress.   NEURO: AAOx3, no AMS or focal deficits.  Answers questions appropriately.                  EYES: Makes eye contact, no obvious visual deficits appreciated.   ENMT:  WNL  CV:  RRR, S1/S2, no m/r/g.   RESPIRATORY:  No acute distress on RA.  CTA b/l, no w/r/r.   GI:  ND, NBS, non-TTP.  : WNL  MUSCULOSKELETAL:  WNL  SKIN / BREAST: WNL  Extremities: Warm and well perfused, no calf ttp or edema b/l.   Vascular: Pulses 2+  Incisions: L midcab incision: Completely healed, non-TTP.                                                           LABS:                        12.2   8.4   )-----------( 393      ( 02 Oct 2017 16:49 )             36.7       RADIOLOGY & ADDITIONAL STUDIES:  CAROTID U/S: pending    CXR: pending    CT Scan: pending    EKG: pending     TTE / LAVELLE: pending     Cardiac Cath: pending

## 2017-10-02 NOTE — H&P ADULT - HISTORY OF PRESENT ILLNESS
This is a 85 y/o male with HTN, known CAD s/p PCI in 2016 and midCAB June 2017 presented to the ER with 1 week of intermittent mid sternal chest pain at rest. He is unable to describe or quantify pain but denies any associated symptoms. In the ER, he is currently pain free, initial VSS, 1st troponin negative, EKG without acute change. He received tylenol 650mg x 1 and admitted for possible cardiac catheterization with possible intervention

## 2017-10-02 NOTE — H&P ADULT - NSHPPHYSICALEXAM_GEN_ALL_CORE
Temp 98.6F, HR 70bpm, /80, RR 16, O2 sat 98% RA, Weight 200lbs, Height 5' 6"    T(C): 36.6 (10-02-17 @ 20:35), Max: 36.6 (10-02-17 @ 20:35)  HR: 55 (10-02-17 @ 20:35) (55 - 68)  BP: 145/64 (10-02-17 @ 20:35) (145/64 - 169/69)  RR: 18 (10-02-17 @ 20:35) (17 - 18)  SpO2: 98% (10-02-17 @ 20:35) (96% - 99%)  Wt(kg): --    Appearance: Normal	  HEENT:   Normal oral mucosa, PERRL, EOMI	  Neck: Supple, - JVD; No Carotid Bruit and 2+ pulses B/L  Cardiovascular: Normal S1 S2, No JVD, No murmurs  Respiratory: Lungs clear to auscultation/Decreased Breath Sounds/No Rales, Rhonchi, Wheezing	  Gastrointestinal:  Soft, Non-tender, + BS	  Skin: No rashes, No ecchymoses, No cyanosis  Extremities: Normal range of motion, No clubbing, cyanosis or edema  Vascular: Femoral pulses 2+ b/l without bruit, DP 1+ b/l, PT 1+ b/l  Neurologic: Non-focal  Psychiatry: A & O x 3, Mood & affect appropriate

## 2017-10-02 NOTE — H&P ADULT - PROBLEM SELECTOR PLAN 1
Serial troponin  Telemetry  NPO for cath in AM  To be added on the schedule with Dr Tang  Continue Lipitor, Aspirin and Plavix

## 2017-10-02 NOTE — ED ADULT NURSE NOTE - PMH
HLD (hyperlipidemia)    HTN (hypertension)    Myocardial infarction    Prostate CA    Stented coronary artery

## 2017-10-02 NOTE — CONSULT NOTE ADULT - ASSESSMENT
84y Male with history of HTN, HLD, prostate CA s/p radiation therapy, CAD s/p PCI in 2016 to OM1 and PTCA to mLAD who was admitted to [facility] in 6/2017 where he underwent an uncomplicated MIDCAB (LIMA to LAD), discharged on POD 3 after an uncomplicated post-op course.  Post-operatively, he states that he has done well overall but states that he has experienced intermittent 5/10 substernal CP, non-radiating, exacerbated by exertion and anxiety and relieved with rest, lasting <10min with each episode. He was seen in outpatient follow-up last week by Dr. Luo who recommended the patient to follow-up with his outpatient cardiologist for stress test.  However, the patient states that he refused to undergo a second stress test as he does not trust their results and presented to [facility] ED on 10/2/17 for further evaluation of his intermittent chest pain.  CTS, Dr. Luo, was consulted given prior history of surgical intervention.      The following recommendations were discussed with ED team and cardiology team:    1. CAD, hx of MIDCAB and PCI  -Given patient's cardiac history, low suspicion of ACS. Recommend full cardiac workup including enzymes, EKG, CXR, and ECHO to evaluate chest pain  -Recommend overnight admission to cardiology, Dr. Tang's service, and recommend possible diagnostic cardiac catheterization to evaluation for patency of LIMA to LAD graft as well as OM1 stent.   -C/w ASA, plavix, Lipitor, and BP medications.     We will continue to follow and offer further recommendations as clinically appropriate.  Thank you for this consultation!

## 2017-10-02 NOTE — ED ADULT NURSE NOTE - OBJECTIVE STATEMENT
c/o intermittent mid sternal chest pain with numbness to left arm that started ~ 1 week ago while at rest. Pt states the pain lasted for ~ 3-4 minutes. Denies sob, dizziness, n/v, f/c or pain, cough at the moment. PHX open heart surgery with cardiac stents ~ 4 months.

## 2017-10-02 NOTE — H&P ADULT - NSHPREVIEWOFSYSTEMS_GEN_ALL_CORE
GENERAL, CONSTITUTIONAL : denies recent weight loss, fever, chills  EYES, VISION: denies changes in vision   EARS, NOSE, THROAT: denies hearing loss  HEART, CARDIOVASCULAR: denies chest pain, arrhythmia, palpitations, SOB,  LE edema, claudication  RESPIRATORY: Denies cough, SOB, wheezing, PND, orthopnea  GASTROINTESTINAL: Denies abdominal pain, heartburn, bloody stool, dark tarry stool  GENITOURINARY: Denies frequent urination, urgency  MUSCULOSKELETAL denies joint pain or swelling, restricted motion, musculoskeletal pain.   SKIN & INTEGUMENTARY Denies rashes, sores, blisters, blisters, growths.  NEUROLOGICAL: Denies numbness or tingling sensations, sensation loss, burning.   PSYCHIATRIC: Denies nervousness, anxiety, depression  ENDOCRINE Denies heat or cold intolerance, excessive thirst  HEMATOLOGIC/LYMPHATIC: Denies abnormal bleeding, bleeding of any kind

## 2017-10-02 NOTE — ED PROVIDER NOTE - ATTENDING CONTRIBUTION TO CARE
84 male with hx of HTN HLD CAD PCI 2016 and midCAB 6/17 with intermittent central substernal CP  x 1 week no sob  no N/V no diaphoresis  no syncope or palp- no acute ekg changes  will admit for cards tele serial ecgs consider cath

## 2017-10-02 NOTE — ED PROVIDER NOTE - OBJECTIVE STATEMENT
85 y/o male with hx of HTN, HLD, CAD s/p PCI in 2016 and midCAB June 2017 c/o cp. pt states intermittent pain past 1 wk. pt states dull discomfort to chest. Sx's occurred today which prompted pt to come to ED. pt currently pain free. no sob, dyspnea or wheezing. no back pain. no ha or dizziness. no leg pain or swelling. no abd pian, n/v. no further complaints.

## 2017-10-02 NOTE — ED PROVIDER NOTE - MEDICAL DECISION MAKING DETAILS
chest pain with hx of CAD. pt asymptomatic at this time. ecg no acute pathology. troponin negative. pt evaluated by CTA PA in ed and recommend admit to cardiology. case d/w Dr Tang and will admit to Dr López with plan to cath in am.

## 2017-10-02 NOTE — ED ADULT TRIAGE NOTE - OTHER COMPLAINTS
pt c.o midsternal chest discomfort, intermittent x 1 week with reports of L sided arm numbness since this am. denies sob. hx open heart surgery 5 months ago. takes plavix daily. denies sob. ekg in progress.

## 2017-10-02 NOTE — H&P ADULT - NSHPLABSRESULTS_GEN_ALL_CORE
12.2   8.4   )-----------( 393      ( 02 Oct 2017 16:49 )             36.7       10-02    140  |  103  |  23  ----------------------------<  110<H>  4.9   |  24  |  1.16    Ca    9.2      02 Oct 2017 16:49    TPro  7.1  /  Alb  3.9  /  TBili  0.3  /  DBili  x   /  AST  25  /  ALT  23  /  AlkPhos  98  10-02      PT/INR - ( 02 Oct 2017 16:49 )   PT: 12.9 sec;   INR: 1.16          PTT - ( 02 Oct 2017 16:49 )  PTT:29.7 sec    CARDIAC MARKERS ( 02 Oct 2017 16:49 )  x     / <0.01 ng/mL / 69 U/L / x     / 2.2 ng/mL

## 2017-10-02 NOTE — ED ADULT NURSE NOTE - CHPI ED SYMPTOMS NEG
no fever/no cough/no diaphoresis/no vomiting/no chills/no nausea/no syncope/no dizziness/no shortness of breath

## 2017-10-03 ENCOUNTER — TRANSCRIPTION ENCOUNTER (OUTPATIENT)
Age: 82
End: 2017-10-03

## 2017-10-03 VITALS — SYSTOLIC BLOOD PRESSURE: 153 MMHG | HEART RATE: 52 BPM | RESPIRATION RATE: 17 BRPM | DIASTOLIC BLOOD PRESSURE: 70 MMHG

## 2017-10-03 LAB
ANION GAP SERPL CALC-SCNC: 12 MMOL/L — SIGNIFICANT CHANGE UP (ref 5–17)
BUN SERPL-MCNC: 17 MG/DL — SIGNIFICANT CHANGE UP (ref 7–23)
CALCIUM SERPL-MCNC: 9.8 MG/DL — SIGNIFICANT CHANGE UP (ref 8.4–10.5)
CHLORIDE SERPL-SCNC: 104 MMOL/L — SIGNIFICANT CHANGE UP (ref 96–108)
CO2 SERPL-SCNC: 27 MMOL/L — SIGNIFICANT CHANGE UP (ref 22–31)
CREAT SERPL-MCNC: 1.09 MG/DL — SIGNIFICANT CHANGE UP (ref 0.5–1.3)
GLUCOSE SERPL-MCNC: 95 MG/DL — SIGNIFICANT CHANGE UP (ref 70–99)
HCT VFR BLD CALC: 36.2 % — LOW (ref 39–50)
HGB BLD-MCNC: 12.3 G/DL — LOW (ref 13–17)
MAGNESIUM SERPL-MCNC: 2 MG/DL — SIGNIFICANT CHANGE UP (ref 1.6–2.6)
MCHC RBC-ENTMCNC: 29.1 PG — SIGNIFICANT CHANGE UP (ref 27–34)
MCHC RBC-ENTMCNC: 34 G/DL — SIGNIFICANT CHANGE UP (ref 32–36)
MCV RBC AUTO: 85.6 FL — SIGNIFICANT CHANGE UP (ref 80–100)
PLATELET # BLD AUTO: 328 K/UL — SIGNIFICANT CHANGE UP (ref 150–400)
POTASSIUM SERPL-MCNC: 4.8 MMOL/L — SIGNIFICANT CHANGE UP (ref 3.5–5.3)
POTASSIUM SERPL-SCNC: 4.8 MMOL/L — SIGNIFICANT CHANGE UP (ref 3.5–5.3)
RBC # BLD: 4.23 M/UL — SIGNIFICANT CHANGE UP (ref 4.2–5.8)
RBC # FLD: 14.1 % — SIGNIFICANT CHANGE UP (ref 10.3–16.9)
SODIUM SERPL-SCNC: 143 MMOL/L — SIGNIFICANT CHANGE UP (ref 135–145)
TROPONIN T SERPL-MCNC: <0.01 NG/ML — SIGNIFICANT CHANGE UP (ref 0–0.01)
TROPONIN T SERPL-MCNC: <0.01 NG/ML — SIGNIFICANT CHANGE UP (ref 0–0.01)
WBC # BLD: 6.3 K/UL — SIGNIFICANT CHANGE UP (ref 3.8–10.5)
WBC # FLD AUTO: 6.3 K/UL — SIGNIFICANT CHANGE UP (ref 3.8–10.5)

## 2017-10-03 PROCEDURE — 85027 COMPLETE CBC AUTOMATED: CPT

## 2017-10-03 PROCEDURE — 71045 X-RAY EXAM CHEST 1 VIEW: CPT

## 2017-10-03 PROCEDURE — 83735 ASSAY OF MAGNESIUM: CPT

## 2017-10-03 PROCEDURE — C1894: CPT

## 2017-10-03 PROCEDURE — 82550 ASSAY OF CK (CPK): CPT

## 2017-10-03 PROCEDURE — C1769: CPT

## 2017-10-03 PROCEDURE — 85610 PROTHROMBIN TIME: CPT

## 2017-10-03 PROCEDURE — 99238 HOSP IP/OBS DSCHRG MGMT 30/<: CPT

## 2017-10-03 PROCEDURE — C1760: CPT

## 2017-10-03 PROCEDURE — 85025 COMPLETE CBC W/AUTO DIFF WBC: CPT

## 2017-10-03 PROCEDURE — 85730 THROMBOPLASTIN TIME PARTIAL: CPT

## 2017-10-03 PROCEDURE — 80048 BASIC METABOLIC PNL TOTAL CA: CPT

## 2017-10-03 PROCEDURE — 99285 EMERGENCY DEPT VISIT HI MDM: CPT | Mod: 25

## 2017-10-03 PROCEDURE — 84484 ASSAY OF TROPONIN QUANT: CPT

## 2017-10-03 PROCEDURE — 93005 ELECTROCARDIOGRAM TRACING: CPT

## 2017-10-03 PROCEDURE — 93459 L HRT ART/GRFT ANGIO: CPT | Mod: 26

## 2017-10-03 PROCEDURE — 83880 ASSAY OF NATRIURETIC PEPTIDE: CPT

## 2017-10-03 PROCEDURE — C1887: CPT

## 2017-10-03 PROCEDURE — 80053 COMPREHEN METABOLIC PANEL: CPT

## 2017-10-03 PROCEDURE — 36415 COLL VENOUS BLD VENIPUNCTURE: CPT

## 2017-10-03 PROCEDURE — 82553 CREATINE MB FRACTION: CPT

## 2017-10-03 RX ORDER — RANOLAZINE 500 MG/1
1 TABLET, FILM COATED, EXTENDED RELEASE ORAL
Qty: 0 | Refills: 0 | COMMUNITY
Start: 2017-10-03

## 2017-10-03 RX ORDER — ATENOLOL 25 MG/1
1 TABLET ORAL
Qty: 0 | Refills: 0 | COMMUNITY
Start: 2017-10-03

## 2017-10-03 RX ORDER — SODIUM CHLORIDE 9 MG/ML
1000 INJECTION INTRAMUSCULAR; INTRAVENOUS; SUBCUTANEOUS
Qty: 0 | Refills: 0 | Status: DISCONTINUED | OUTPATIENT
Start: 2017-10-03 | End: 2017-10-03

## 2017-10-03 RX ADMIN — RANOLAZINE 500 MILLIGRAM(S): 500 TABLET, FILM COATED, EXTENDED RELEASE ORAL at 05:39

## 2017-10-03 RX ADMIN — AMLODIPINE BESYLATE 10 MILLIGRAM(S): 2.5 TABLET ORAL at 05:39

## 2017-10-03 RX ADMIN — Medication 325 MILLIGRAM(S): at 05:39

## 2017-10-03 RX ADMIN — SODIUM CHLORIDE 75 MILLILITER(S): 9 INJECTION INTRAMUSCULAR; INTRAVENOUS; SUBCUTANEOUS at 05:39

## 2017-10-03 RX ADMIN — CLOPIDOGREL BISULFATE 75 MILLIGRAM(S): 75 TABLET, FILM COATED ORAL at 05:39

## 2017-10-03 RX ADMIN — ATENOLOL 25 MILLIGRAM(S): 25 TABLET ORAL at 05:39

## 2017-10-03 RX ADMIN — SODIUM CHLORIDE 75 MILLILITER(S): 9 INJECTION INTRAMUSCULAR; INTRAVENOUS; SUBCUTANEOUS at 11:57

## 2017-10-03 NOTE — DISCHARGE NOTE ADULT - MEDICATION SUMMARY - MEDICATIONS TO TAKE
I will START or STAY ON the medications listed below when I get home from the hospital:    Aspirin Enteric Coated 81 mg oral delayed release tablet  -- 1 tab(s) by mouth once a day  -- Indication: For Coronary Artery Disease    acetaminophen-oxycodone 325 mg-5 mg oral tablet  -- 1 tab(s) by mouth every 8 hours, As Needed, Severe Pain (7 - 10) MDD:no more than 4 tabs  -- Indication: For Pain    ranolazine 500 mg oral tablet, extended release  -- 1 tab(s) by mouth 2 times a day  -- Indication: For Chest pain    Lipitor 20 mg oral tablet  -- 1 tab(s) by mouth once a day (at bedtime)  -- Indication: For High Cholesterol    Plavix 75 mg oral tablet  -- 1 tab(s) by mouth once a day  -- Indication: For Coronary Artery Disease    atenolol 25 mg oral tablet  -- 1 tab(s) by mouth once a day  -- Indication: For High Blood Pressure    Norvasc 10 mg oral tablet  -- 1 tab(s) by mouth once a day  -- Indication: For High Blood Pressure    polyethylene glycol 3350 oral powder for reconstitution  -- 17 gram(s) by mouth once a day (at bedtime).  OTC  -- Indication: For Constipation    pantoprazole 20 mg oral delayed release tablet  -- 1 tab(s) by mouth once a day  -- Indication: For Stomach Protection

## 2017-10-03 NOTE — DISCHARGE NOTE ADULT - CARE PROVIDER_API CALL
Jez Luo), Cardiovascular Surgery  [address]  [address] [place] [address]  Phone: [phone]  Fax: [phone]    Nikko Tang (MD), Cardiology; Interventional Cardiology  [address] [address]  Phone: [phone]  Fax: [phone] Jez Luo), Cardiovascular Surgery  [address]  [address] [place] [address]  Phone: [phone]  Fax: [phone]    Nikko Tang (MD), Cardiology; Interventional Cardiology  [address] [address]  Phone: [phone]  Fax: [phone]    Shravan Hilario), Cardiovascular Disease; Interventional Cardiology  [address] [place] [address]  Phone: [phone]  Fax: [phone]

## 2017-10-03 NOTE — DISCHARGE NOTE ADULT - CARE PROVIDERS DIRECT ADDRESSES
,[email or web address]_Unknown

## 2017-10-03 NOTE — DISCHARGE NOTE ADULT - PLAN OF CARE
You presented with Chest Pain. We did a blood test to measure your Troponins, which is a cardiac enzyme that measures damage to your heart muscle. Your troponin level was NEGATIVE, which indicates that you are NOT having a heart attack. We did a cardiac angiogram which was unchanged from prior and showed that your coronary grafts are patent/ open. Please continue your Aspirin 81mg daily and you Plavix (Clopidogrel) 75mg daily, as previously prescribed. You have a diagnosis of coronary artery disease and on a prior admission, you received a stent and a graft to your coronary artery. You are on daily Aspirin 81mg daily and Plavix (Clopidogrel) 75mg and you should continue the medications to ensure your stent does not close. DO NOT STOP THESE MEDICATIONS FOR ANY REASON UNLESS OTHERWISE INDICATED BY YOUR CARDIOLOGIST BECAUSE THIS WILL PUT YOU AT RISK FOR A HEART ATTACK. You should refrain from strenuous activity and heavy lifting for 1 week. Please make a follow up appointment with your cardiologist within 1-2 weeks of your discharge. Please continue you Norvasc 10mg daily, Atenolol 25mg daily and Ranexa (Ranazoline) 500mg twice daily. Please maintain a low sodium diet and monitor your Blood Pressure with a portable cuff at home. Please continue you Lipitor 20mg daily. Please maintain a diet low in cholesterol and fat.

## 2017-10-03 NOTE — DISCHARGE NOTE ADULT - PATIENT PORTAL LINK FT
“You can access the [facility] Patient Portal, offered by Health system, by registering with the following website: [email or web address]”

## 2017-10-03 NOTE — DISCHARGE NOTE ADULT - HOSPITAL COURSE
83 y/o male with HTN, known CAD s/p PCI in 2016 and midCAB June 2017 presented to the ER with 1 week of intermittent mid sternal chest pain at rest. He was unable to describe or quantify pain but denies any associated symptoms. This morning he was Chest Pain free, VSS, troponins negative x 3, EKG without acute ST-T changed. Labs are stable, no events on telemetry during his stay. He received tylenol 650mg x 1 and was admitted for possible cardiac catheterization with possible intervention. He is now s/p diagnostic Cardiac Cath with Dr. Tang 10/3 which is unchanged from prior angiogram. No intervention was necessary per Dr. Tang. Right groin Angiosealed. Post procedure pt remained asymptomatic denying CP, SOB, palpitations, N/V, bleeding/bruising from access site. Pt was able to void and ambulate without difficulty. Labs, telemetry and vital signs reviewed. Pt will go home without social work needs. All appropriate discharge instructions given and pt instructed to return to nearest ED if they experience any chest pain, shortness of breath, worsening symptoms, bleeding from access site, hard lump under access site, or if they experience numbness or tingling in the extremity. Labs and vitals remained stable overnight. Pt deemed stable for d/c 10/3 by Dr. Green.    . 83 y/o male with HTN, known CAD s/p PCI in 2016 and midCAB June 2017 presented to the ER with 1 week of intermittent mid sternal chest pain at rest. He was unable to describe or quantify pain but denies any associated symptoms. This morning he was Chest Pain free, VSS, troponins negative x 3, EKG without acute ST-T changed. Labs are stable, no events on telemetry during his stay. He received tylenol 650mg x 1 and was admitted for possible cardiac catheterization with possible intervention. He is now s/p diagnostic Cardiac Cath with Dr. Tang 10/3 which is unchanged from prior angiogram per the fellow Debra, who was in the room . No intervention was necessary per Dr. Tang. Left groin Angiosealed. Post procedure pt remained asymptomatic denying CP, SOB, palpitations, N/V, bleeding/bruising from access site. Pt was able to void and ambulate without difficulty. Labs, telemetry and vital signs reviewed. Pt will go home without social work needs. All appropriate discharge instructions given and pt instructed to return to nearest ED if they experience any chest pain, shortness of breath, worsening symptoms, bleeding from access site, hard lump under access site, or if they experience numbness or tingling in the extremity. Labs and vitals remained stable. Pt deemed stable for d/c 10/3 by Dr. Green. He will follow up with his cardiologist Sulaiman this week.     . 85 y/o male with HTN, known CAD s/p PCI in 2016 and midCAB June 2017 presented to the ER with 1 week of intermittent mid sternal chest pain at rest. He was unable to describe or quantify pain but denies any associated symptoms. This morning he was Chest Pain free, VSS, troponins negative x 3, EKG without acute ST-T changed. Labs are stable, no events on telemetry during his stay. He received tylenol 650mg x 1 and was admitted for possible cardiac catheterization with possible intervention. He is now s/p diagnostic Cardiac Cath with Dr. Tang 10/3 which is unchanged from prior angiogram per the fellow Debra, who was in the room . No intervention was necessary per Dr. Tang. Right groin Angiosealed. Post procedure pt remained asymptomatic denying CP, SOB, palpitations, N/V, bleeding/bruising from access site. Pt was able to void and ambulate without difficulty. Labs, telemetry and vital signs reviewed. Lungs are CTA b/l and heart sounds normal. Telemetry revealed SB @ 55 BPM, asymptomatic. Pt will go home without social work needs. All appropriate discharge instructions given and pt instructed to return to nearest ED if they experience any chest pain, shortness of breath, worsening symptoms, bleeding from access site, hard lump under access site, or if they experience numbness or tingling in the extremity. Labs and vitals remained stable. Pt deemed stable for d/c 10/3 by Dr. Green. He will follow up with his cardiologist Sulaiman this week.     .

## 2017-10-03 NOTE — DISCHARGE NOTE ADULT - PROVIDER TOKENS
TOKEN:'[id number]',TOKEN:'[id number]' TOKEN:'[id number]:'[id number]'

## 2017-10-03 NOTE — DISCHARGE NOTE ADULT - CARE PLAN
Principal Discharge DX:	Chest pain  Goal:	You presented with Chest Pain. We did a blood test to measure your Troponins, which is a cardiac enzyme that measures damage to your heart muscle. Your troponin level was NEGATIVE, which indicates that you are NOT having a heart attack. We did a cardiac angiogram which was unchanged from prior and showed that your coronary grafts are patent/ open. Please continue your Aspirin 81mg daily and you Plavix (Clopidogrel) 75mg daily, as previously prescribed.  Instructions for follow-up, activity and diet:	You have a diagnosis of coronary artery disease and on a prior admission, you received a stent and a graft to your coronary artery. You are on daily Aspirin 81mg daily and Plavix (Clopidogrel) 75mg and you should continue the medications to ensure your stent does not close. DO NOT STOP THESE MEDICATIONS FOR ANY REASON UNLESS OTHERWISE INDICATED BY YOUR CARDIOLOGIST BECAUSE THIS WILL PUT YOU AT RISK FOR A HEART ATTACK. You should refrain from strenuous activity and heavy lifting for 1 week. Please make a follow up appointment with your cardiologist within 1-2 weeks of your discharge.  Secondary Diagnosis:	HTN (hypertension)  Goal:	Please continue you Norvasc 10mg daily, Atenolol 25mg daily and Ranexa (Ranazoline) 500mg twice daily.  Instructions for follow-up, activity and diet:	Please maintain a low sodium diet and monitor your Blood Pressure with a portable cuff at home.  Secondary Diagnosis:	HLD (hyperlipidemia)  Goal:	Please continue you Lipitor 20mg daily.  Instructions for follow-up, activity and diet:	Please maintain a diet low in cholesterol and fat.

## 2017-10-03 NOTE — DISCHARGE NOTE ADULT - INSTRUCTIONS
You underwent a coronary angiogram and should wait 3 days before returning to ordinary activities. Do not drive for 2 days. Consult your doctor before returning to vigorous activity. You may return to work in 3-5 days. The catheter from your groin was removed and you should remove the dressing in 24 hours. You may shower once the dressing is removed, but avoid baths, hot tubs, or swimming for 5 days to prevent infection. If you notice bleeding from the site, hardening and pain at the site, drainage or redness from the site, coolness/paleness of the extremity, swelling, or fever, please call [phone]. Please continue your aspirin and plavix/brilinta/effient as prescribed unless otherwise indicated by your cardiologist. All of your prescriptions have been sent to the pharmacy. Please follow up with you doctor in 1-2 weeks. All of your needed prescriptions have been sent electronically to your pharmacy. You underwent a coronary angiogram and should wait 3 days before returning to ordinary activities. Do not drive for 2 days. Consult your doctor before returning to vigorous activity. You may return to work in 3-5 days. The catheter from your groin was removed and you should remove the dressing in 24 hours. You may shower once the dressing is removed, but avoid baths, hot tubs, or swimming for 5 days to prevent infection. If you notice bleeding from the site, hardening and pain at the site, drainage or redness from the site, coolness/paleness of the extremity, swelling, or fever, please call [phone]. Please continue your aspirin and plavix as prescribed unless otherwise indicated by your cardiologist. Please follow up with you doctor in 1-2 weeks.

## 2017-10-06 DIAGNOSIS — Z79.02 LONG TERM (CURRENT) USE OF ANTITHROMBOTICS/ANTIPLATELETS: ICD-10-CM

## 2017-10-06 DIAGNOSIS — Z79.82 LONG TERM (CURRENT) USE OF ASPIRIN: ICD-10-CM

## 2017-10-06 DIAGNOSIS — E78.5 HYPERLIPIDEMIA, UNSPECIFIED: ICD-10-CM

## 2017-10-06 DIAGNOSIS — R07.9 CHEST PAIN, UNSPECIFIED: ICD-10-CM

## 2017-10-06 DIAGNOSIS — Z87.891 PERSONAL HISTORY OF NICOTINE DEPENDENCE: ICD-10-CM

## 2017-10-06 DIAGNOSIS — I10 ESSENTIAL (PRIMARY) HYPERTENSION: ICD-10-CM

## 2017-10-06 DIAGNOSIS — Z95.1 PRESENCE OF AORTOCORONARY BYPASS GRAFT: ICD-10-CM

## 2017-10-06 DIAGNOSIS — Z92.3 PERSONAL HISTORY OF IRRADIATION: ICD-10-CM

## 2017-10-06 DIAGNOSIS — I25.10 ATHEROSCLEROTIC HEART DISEASE OF NATIVE CORONARY ARTERY WITHOUT ANGINA PECTORIS: ICD-10-CM

## 2017-10-06 DIAGNOSIS — I25.2 OLD MYOCARDIAL INFARCTION: ICD-10-CM

## 2017-10-06 DIAGNOSIS — Z95.5 PRESENCE OF CORONARY ANGIOPLASTY IMPLANT AND GRAFT: ICD-10-CM

## 2017-10-06 DIAGNOSIS — Z85.46 PERSONAL HISTORY OF MALIGNANT NEOPLASM OF PROSTATE: ICD-10-CM

## 2018-02-02 NOTE — H&P ADULT - HEMATOLOGY/LYMPHATICS
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 After urinating, add 2 cups of bleach to the toilet bowl and let set for 15 minutes before flushing. Wash your hands before and after using the restroom.  Drink water or other fluids as directed after treatment with this medicine.              Follow-ups after your visit        Your next 10 appointments already scheduled     Feb 13, 2018  1:00 PM CST   (Arrive by 12:45 PM)   CYSTO with Randy Martinez MD   [facility] [place] ([facility] )    [address]  [place] [address]   [phone]              Who to contact     If you have questions or need follow up information about today's clinic visit or your schedule please contact [facility] directly at [phone].  Normal or non-critical lab and imaging results will be communicated to you by MyChart, letter or phone within 4 business days after the clinic has received the results. If you do not hear from us within 7 days, please contact the clinic through MyChart or phone. If you have a critical or abnormal lab result, we will notify you by phone as soon as possible.  Submit refill requests through MST or call your pharmacy and they will forward the refill request to us. Please allow 3 business days for your refill to be completed.          Additional Information About Your Visit        MyChart Information     MST lets you send messages to your doctor, view your test results, renew your prescriptions, schedule appointments and more. To sign up, go to  [email or web address] . Click on \"Log in\" on the left side of the screen, which will take you to the Welcome page. Then click on \"Sign up Now\" on the right side of the page.     You will be asked to enter the access code listed below, as well as some personal information. Please follow the directions to create your username and password.     Your access code is: [email or web address]  Expires: 2018 12:59 PM     Your access code will  in 90 days. If you need help or a new code, please call your [facility] clinic or [phone].        Care EveryWhere ID     This is your Care EveryWhere ID. This could be used by other organizations to access your [facility] medical records  [id number]        Your Vitals Were     Pulse Temperature Respirations Height Pulse Oximetry BMI (Body Mass Index)    68 98  F (36.7  C) (Tympanic) 68 1.702 m (5' 7.01\") 94% 34.93 kg/m2       Blood Pressure from Last 3 Encounters:   18 121/76   18 122/68   18 104/56    Weight from Last 3 Encounters:   18 101.2 kg (223 lb 1.6 oz)   18 98.9 kg (218 lb)   18 102.4 kg (225 lb 11.2 oz)              We Performed the Following     UA with Microscopic reflex to Culture        Primary Care Provider Office Phone # Fax #    Luis Tran [phone]       [facility] [address] [place] [address]        Equal Access to Services     [facility]: Hadii oskar knutson hadasho Sodona, waaxda luqadaha, qaybta kaalmada chandra, ralph zacarias. So [facility] [phone].    ATENCIÓN: Si habla español, tiene a berger disposición servicios gratuitos de asistencia lingüística. Llame al [phone].    We comply with applicable federal civil rights laws and [place] laws. We do not discriminate on the basis of race, color, national origin, age, disability, sex, sexual orientation, or gender identity.            Thank you!     Thank you for choosing [facility] [place]  for your " care. Our goal is always to provide you with excellent care. Hearing back from our patients is one way we can continue to improve our services. Please take a few minutes to complete the written survey that you may receive in the mail after your visit with us. Thank you!             Your Updated Medication List - Protect others around you: Learn how to safely use, store and throw away your medicines at www.disposemymeds.org.          This list is accurate as of 2/2/18  1:06 PM.  Always use your most recent med list.                   Brand Name Dispense Instructions for use Diagnosis    IBUPROFEN PO      Take 200 mg by mouth every 6 hours as needed for moderate pain Reported on 5/9/2017        sodium bicarbonate 650 MG tablet     12 tablet    Take 650 mg (1 tablet) the evening prior to each treatment and 1 tablet the morning of each treatment.    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

## 2018-09-03 ENCOUNTER — INPATIENT (INPATIENT)
Facility: HOSPITAL | Age: 83
LOS: 1 days | Discharge: HOME | DRG: 287 | End: 2018-09-05
Attending: INTERNAL MEDICINE | Admitting: INTERNAL MEDICINE
Payer: MEDICARE

## 2018-09-03 VITALS
OXYGEN SATURATION: 96 % | WEIGHT: 166.89 LBS | SYSTOLIC BLOOD PRESSURE: 172 MMHG | HEART RATE: 63 BPM | RESPIRATION RATE: 18 BRPM | TEMPERATURE: 98 F | DIASTOLIC BLOOD PRESSURE: 85 MMHG

## 2018-09-03 DIAGNOSIS — Z90.49 ACQUIRED ABSENCE OF OTHER SPECIFIED PARTS OF DIGESTIVE TRACT: Chronic | ICD-10-CM

## 2018-09-03 DIAGNOSIS — E78.5 HYPERLIPIDEMIA, UNSPECIFIED: ICD-10-CM

## 2018-09-03 DIAGNOSIS — Z91.89 OTHER SPECIFIED PERSONAL RISK FACTORS, NOT ELSEWHERE CLASSIFIED: ICD-10-CM

## 2018-09-03 DIAGNOSIS — I20.0 UNSTABLE ANGINA: ICD-10-CM

## 2018-09-03 DIAGNOSIS — R63.8 OTHER SYMPTOMS AND SIGNS CONCERNING FOOD AND FLUID INTAKE: ICD-10-CM

## 2018-09-03 DIAGNOSIS — N17.9 ACUTE KIDNEY FAILURE, UNSPECIFIED: ICD-10-CM

## 2018-09-03 DIAGNOSIS — I10 ESSENTIAL (PRIMARY) HYPERTENSION: ICD-10-CM

## 2018-09-03 DIAGNOSIS — I25.10 ATHEROSCLEROTIC HEART DISEASE OF NATIVE CORONARY ARTERY WITHOUT ANGINA PECTORIS: ICD-10-CM

## 2018-09-03 PROBLEM — C61 MALIGNANT NEOPLASM OF PROSTATE: Chronic | Status: ACTIVE | Noted: 2017-10-02

## 2018-09-03 LAB
ALBUMIN SERPL ELPH-MCNC: 3.7 G/DL — SIGNIFICANT CHANGE UP (ref 3.3–5)
ALP SERPL-CCNC: 87 U/L — SIGNIFICANT CHANGE UP (ref 40–120)
ALT FLD-CCNC: 18 U/L — SIGNIFICANT CHANGE UP (ref 10–45)
ANION GAP SERPL CALC-SCNC: 11 MMOL/L — SIGNIFICANT CHANGE UP (ref 5–17)
APTT BLD: 26.1 SEC — LOW (ref 27.5–37.4)
AST SERPL-CCNC: 16 U/L — SIGNIFICANT CHANGE UP (ref 10–40)
BASOPHILS NFR BLD AUTO: 0.4 % — SIGNIFICANT CHANGE UP (ref 0–2)
BILIRUB SERPL-MCNC: 0.2 MG/DL — SIGNIFICANT CHANGE UP (ref 0.2–1.2)
BUN SERPL-MCNC: 23 MG/DL — SIGNIFICANT CHANGE UP (ref 7–23)
CALCIUM SERPL-MCNC: 9.8 MG/DL — SIGNIFICANT CHANGE UP (ref 8.4–10.5)
CHLORIDE SERPL-SCNC: 100 MMOL/L — SIGNIFICANT CHANGE UP (ref 96–108)
CK MB CFR SERPL CALC: 2.3 NG/ML — SIGNIFICANT CHANGE UP (ref 0–6.7)
CK SERPL-CCNC: 31 U/L — SIGNIFICANT CHANGE UP (ref 30–200)
CO2 SERPL-SCNC: 27 MMOL/L — SIGNIFICANT CHANGE UP (ref 22–31)
CREAT ?TM UR-MCNC: 31 MG/DL — SIGNIFICANT CHANGE UP
CREAT SERPL-MCNC: 1.46 MG/DL — HIGH (ref 0.5–1.3)
EOSINOPHIL NFR BLD AUTO: 1.9 % — SIGNIFICANT CHANGE UP (ref 0–6)
GLUCOSE SERPL-MCNC: 129 MG/DL — HIGH (ref 70–99)
HCT VFR BLD CALC: 37.9 % — LOW (ref 39–50)
HGB BLD-MCNC: 12.9 G/DL — LOW (ref 13–17)
INR BLD: 1.04 — SIGNIFICANT CHANGE UP (ref 0.88–1.16)
LYMPHOCYTES # BLD AUTO: 17 % — SIGNIFICANT CHANGE UP (ref 13–44)
MCHC RBC-ENTMCNC: 30.9 PG — SIGNIFICANT CHANGE UP (ref 27–34)
MCHC RBC-ENTMCNC: 34 G/DL — SIGNIFICANT CHANGE UP (ref 32–36)
MCV RBC AUTO: 90.7 FL — SIGNIFICANT CHANGE UP (ref 80–100)
MONOCYTES NFR BLD AUTO: 10.8 % — SIGNIFICANT CHANGE UP (ref 2–14)
NEUTROPHILS NFR BLD AUTO: 69.9 % — SIGNIFICANT CHANGE UP (ref 43–77)
NT-PROBNP SERPL-SCNC: 589 PG/ML — HIGH (ref 0–300)
PLATELET # BLD AUTO: 256 K/UL — SIGNIFICANT CHANGE UP (ref 150–400)
POTASSIUM SERPL-MCNC: 4.8 MMOL/L — SIGNIFICANT CHANGE UP (ref 3.5–5.3)
POTASSIUM SERPL-SCNC: 4.8 MMOL/L — SIGNIFICANT CHANGE UP (ref 3.5–5.3)
PROT SERPL-MCNC: 6.4 G/DL — SIGNIFICANT CHANGE UP (ref 6–8.3)
PROTHROM AB SERPL-ACNC: 11.6 SEC — SIGNIFICANT CHANGE UP (ref 9.8–12.7)
RBC # BLD: 4.18 M/UL — LOW (ref 4.2–5.8)
RBC # FLD: 13 % — SIGNIFICANT CHANGE UP (ref 10.3–16.9)
SODIUM SERPL-SCNC: 138 MMOL/L — SIGNIFICANT CHANGE UP (ref 135–145)
SODIUM UR-SCNC: 125 MMOL/L — SIGNIFICANT CHANGE UP
TROPONIN T SERPL-MCNC: <0.01 NG/ML — SIGNIFICANT CHANGE UP (ref 0–0.01)
TROPONIN T SERPL-MCNC: <0.01 NG/ML — SIGNIFICANT CHANGE UP (ref 0–0.01)
UUN UR-MCNC: 326 MG/DL — SIGNIFICANT CHANGE UP
WBC # BLD: 7.9 K/UL — SIGNIFICANT CHANGE UP (ref 3.8–10.5)
WBC # FLD AUTO: 7.9 K/UL — SIGNIFICANT CHANGE UP (ref 3.8–10.5)

## 2018-09-03 PROCEDURE — 93010 ELECTROCARDIOGRAM REPORT: CPT

## 2018-09-03 PROCEDURE — 99285 EMERGENCY DEPT VISIT HI MDM: CPT | Mod: 25

## 2018-09-03 PROCEDURE — 71045 X-RAY EXAM CHEST 1 VIEW: CPT | Mod: 26

## 2018-09-03 RX ORDER — POLYETHYLENE GLYCOL 3350 17 G/17G
17 POWDER, FOR SOLUTION ORAL AT BEDTIME
Qty: 0 | Refills: 0 | Status: DISCONTINUED | OUTPATIENT
Start: 2018-09-03 | End: 2018-09-05

## 2018-09-03 RX ORDER — ATORVASTATIN CALCIUM 80 MG/1
20 TABLET, FILM COATED ORAL AT BEDTIME
Qty: 0 | Refills: 0 | Status: DISCONTINUED | OUTPATIENT
Start: 2018-09-03 | End: 2018-09-05

## 2018-09-03 RX ORDER — INFLUENZA VIRUS VACCINE 15; 15; 15; 15 UG/.5ML; UG/.5ML; UG/.5ML; UG/.5ML
0.5 SUSPENSION INTRAMUSCULAR ONCE
Qty: 0 | Refills: 0 | Status: COMPLETED | OUTPATIENT
Start: 2018-09-03 | End: 2018-09-05

## 2018-09-03 RX ORDER — ASPIRIN/CALCIUM CARB/MAGNESIUM 324 MG
81 TABLET ORAL DAILY
Qty: 0 | Refills: 0 | Status: DISCONTINUED | OUTPATIENT
Start: 2018-09-03 | End: 2018-09-05

## 2018-09-03 RX ORDER — AMLODIPINE BESYLATE 2.5 MG/1
10 TABLET ORAL DAILY
Qty: 0 | Refills: 0 | Status: DISCONTINUED | OUTPATIENT
Start: 2018-09-04 | End: 2018-09-04

## 2018-09-03 RX ORDER — CLOPIDOGREL BISULFATE 75 MG/1
75 TABLET, FILM COATED ORAL DAILY
Qty: 0 | Refills: 0 | Status: DISCONTINUED | OUTPATIENT
Start: 2018-09-03 | End: 2018-09-03

## 2018-09-03 RX ORDER — CLOPIDOGREL BISULFATE 75 MG/1
75 TABLET, FILM COATED ORAL DAILY
Qty: 0 | Refills: 0 | Status: DISCONTINUED | OUTPATIENT
Start: 2018-09-04 | End: 2018-09-05

## 2018-09-03 RX ORDER — ATENOLOL 25 MG/1
25 TABLET ORAL DAILY
Qty: 0 | Refills: 0 | Status: DISCONTINUED | OUTPATIENT
Start: 2018-09-03 | End: 2018-09-04

## 2018-09-03 RX ORDER — ASPIRIN/CALCIUM CARB/MAGNESIUM 324 MG
243 TABLET ORAL ONCE
Qty: 0 | Refills: 0 | Status: COMPLETED | OUTPATIENT
Start: 2018-09-03 | End: 2018-09-03

## 2018-09-03 RX ORDER — RANOLAZINE 500 MG/1
500 TABLET, FILM COATED, EXTENDED RELEASE ORAL
Qty: 0 | Refills: 0 | Status: DISCONTINUED | OUTPATIENT
Start: 2018-09-03 | End: 2018-09-03

## 2018-09-03 RX ORDER — CLOPIDOGREL BISULFATE 75 MG/1
600 TABLET, FILM COATED ORAL ONCE
Qty: 0 | Refills: 0 | Status: COMPLETED | OUTPATIENT
Start: 2018-09-03 | End: 2018-09-03

## 2018-09-03 RX ORDER — PANTOPRAZOLE SODIUM 20 MG/1
40 TABLET, DELAYED RELEASE ORAL
Qty: 0 | Refills: 0 | Status: DISCONTINUED | OUTPATIENT
Start: 2018-09-03 | End: 2018-09-05

## 2018-09-03 RX ORDER — AMLODIPINE BESYLATE 2.5 MG/1
10 TABLET ORAL DAILY
Qty: 0 | Refills: 0 | Status: DISCONTINUED | OUTPATIENT
Start: 2018-09-03 | End: 2018-09-03

## 2018-09-03 RX ADMIN — ATORVASTATIN CALCIUM 20 MILLIGRAM(S): 80 TABLET, FILM COATED ORAL at 21:18

## 2018-09-03 RX ADMIN — AMLODIPINE BESYLATE 10 MILLIGRAM(S): 2.5 TABLET ORAL at 21:18

## 2018-09-03 RX ADMIN — CLOPIDOGREL BISULFATE 600 MILLIGRAM(S): 75 TABLET, FILM COATED ORAL at 21:56

## 2018-09-03 RX ADMIN — Medication 243 MILLIGRAM(S): at 21:56

## 2018-09-03 NOTE — H&P ADULT - PROBLEM SELECTOR PLAN 4
- Patient on Atenolol 25mg OD (perhaps subject to change to cardio-specific BB?)  - Norvasc 10mg, taken in the evening   - -180s thus far - will likely need addl agent, can consider ACEi/ARB if no contra-indications when RENÉE resolves. - HR in the 40-50 range, patient takes Atenolol 25mg OD for HTN  - In reading prior notes, patient was meant to have stopped this medication given bradycardia   - Have d/noelle it at this time - can re-adjust meds as needed - if beta-blocker tolerated, possibly Co-reg?

## 2018-09-03 NOTE — H&P ADULT - PROBLEM SELECTOR PROBLEM 6
Transition of care performed with sharing of clinical summary Nutrition, metabolism, and development symptoms HLD (hyperlipidemia)

## 2018-09-03 NOTE — H&P ADULT - ASSESSMENT
84 y/o male with HTN, Prostate Ca s/p radiation, known CAD s/p PCI in 2016 [NARESH OM1 & PTCA midLAD] and midCAB June 2017, most recent cardiac catheterization with Dr Tang 10/17 - diagnostic, no intervention performed - presents with a 4 day history of exertional chest pain and shortness of breath.

## 2018-09-03 NOTE — ED ADULT NURSE NOTE - OBJECTIVE STATEMENT
Pt presents to ED c/o chest discomfort and SOB x1 week. Pt with PMH open heart and x6 cardiac stents presents today with 1 week of intermittent chest "discomfort" per pt, L sided, not related to activity, currently 2/10. Pt also reporting SOB, both at rest and on exertion, sometimes with CP sometimes not, though no SOB on exam. Pt denies fever, chills, HA, cough, dizziness, lightheadedness, numbness/tingling, CP, palpitations, SOB, N/V/D, difficulty urinating, difficulty passing BM. Pt presents in NAD speaking full sentences ambulatory through triage. EKG preformed in triage. Pt placed on continuous cardiac monitor and pulse ox on arrival to bed 18.

## 2018-09-03 NOTE — H&P ADULT - NSHPLABSRESULTS_GEN_ALL_CORE
12.9   7.9   )-----------( 256      ( 03 Sep 2018 16:52 )             37.9       138  |  100  |  23  ----------------------------<  129<H>  4.8   |  27  |  1.46<H>    Ca    9.8      03 Sep 2018 16:52    TPro  6.4  /  Alb  3.7  /  TBili  0.2  /  DBili  x   /  AST  16  /  ALT  18  /  AlkPhos  87  09-03        PT/INR - ( 03 Sep 2018 16:52 )   PT: 11.6 sec;   INR: 1.04        PTT - ( 03 Sep 2018 16:52 )  PTT:26.1 sec    CARDIAC MARKERS ( 03 Sep 2018 16:52 )  x     / <0.01 ng/mL / x     / x     / x

## 2018-09-03 NOTE — H&P ADULT - NSHPPHYSICALEXAM_GEN_ALL_CORE
PHYSICAL EXAM:  Constitutional: Patient seated comfortably in bed, of appropriate color, nutrition, and hydration.   HEENT: PERRLA, Normal Range of eye movement with no complaint of diplopia, Normal Hearing  Neck: No LAD, No JVD  Back: Normal spine flexure, No CVA tenderness  Respiratory: Normal air entry, Lungs clear to auscultation b/l w/o wheeze or crepitations.   Cardiovascular: S1 and S2 present - no additional abnormal sounds or murmurs. Normal rhythm and rate of pulse.   Gastrointestinal: BS+, soft, NT/ND  Extremities: No peripheral edema  Vascular: 2+ peripheral pulses  Neurological: A/O x 3, CN V-XII grossly intact.  Psychiatric: Normal mood, normal affect  Musculoskeletal: 5/5 strength b/l upper and lower extremities  Skin: No rashes

## 2018-09-03 NOTE — H&P ADULT - HISTORY OF PRESENT ILLNESS
84 y/o male with HTN, Prostate Ca s/p radiation, known CAD s/p PCI in 2016 [NARESH OM1 & PTCA midLAD] and midCAB June 2017, most recent cardiac catheterization with Dr Tang 10/17 - diagnostic, no intervention performed - presents with a 4 day history of exertional chest pain and shortness of breath.       In the ED, vitals as follows: T 97.8F | HR 57-63bpm | -172/76-85mmHg | RR 18 | SpO2 98-99% on RA  Labs s/f: Cr 1.46, Negative Cardiac Enzymes, BNP ~ 500  Imaging: CXR - clear.     Patient was admitted to [facility] for further workup. 84 y/o male with HTN, Prostate Ca s/p radiation, known CAD s/p PCI in 2016 [NARESH OM1 & PTCA midLAD] and midCAB May 31, 2017, most recent cardiac catheterization with Dr Tang 10/17 - diagnostic, no intervention performed - presents with a 4 day history of exertional chest pain and shortness of breath. Patient relays that the symptoms are in line with prior anginal episodes that have led to cardiac catheterization. Prior to this, patient able to walk up to 1/2mile without symptoms. Patient follows with Dr Hilario (Cardiology) and last saw Dr Jez Luo Sep 2017 following midCAB who relayed that patient was well at that time.     As an aside, patient mentions he follows with Neurology and was supposedly told he had a brain bleed - since asked for a second opinion with unclear follow up. Further collateral needed, but patient requesting MRI Brain.     In the ED, vitals as follows: T 97.8F | HR 57-63bpm | -172/76-85mmHg | RR 18 | SpO2 98-99% on RA  Labs s/f: Cr 1.46, Negative Cardiac Enzymes, BNP ~ 500  Imaging: CXR - clear.     Patient was admitted to [facility] for further workup.

## 2018-09-03 NOTE — H&P ADULT - PROBLEM SELECTOR PLAN 5
- C/w Lipitor 20mg per nocte - clarify why patient isn't on a high dose statin for CAD.  - F/u AM Lipid Profile - Patient on Atenolol 25mg OD (perhaps subject to change to cardio-specific BB?)  - Norvasc 10mg, taken in the evening   - -180s thus far - will likely need addl agent, can consider ACEi/ARB if no contra-indications when RENÉE resolves.

## 2018-09-03 NOTE — ED PROVIDER NOTE - MEDICAL DECISION MAKING DETAILS
Pt with chest pain and dyspnea x several days with symptoms similar to prior symptoms requiring stent, no active cp in ED, PE wnl, labs and cxr wnl, ekg unchanged, discussed with cards fellow who agrees on admission for definitive testing to r/o acute acs - possible cath.  Pt took aspirin this morning.

## 2018-09-03 NOTE — H&P ADULT - PROBLEM SELECTOR PLAN 3
- Cr 1.46 (mild RENÉE) when baseline appears to be around 1   - F/u FeNa to determine etiology   - Avoid Nephrotoxic Agents at this time - Cr 1.46 (mild RENÉE) when baseline appears to be around 1   - Post-renal based on urine lytes though patient has been urinating appropriately with no evidence of obstruction   - Patient does have a history of Prostate Ca s/p radiation   - Avoid Nephrotoxic Agents at this time

## 2018-09-03 NOTE — ED PROVIDER NOTE - OBJECTIVE STATEMENT
86 y/o m with PMH of CAD s/p multiple stents (last in 2016), CABG x 1 vessel, HTN, HLD presents to ED with cp and shortness of breath x several days worst with exertion.  Pt states he has had similar symptoms in past when requiring stents. Follows with cardiologist in [place].  Last cath 10/17 at [facility] without intervention.  Denies cp in ED.  Has been taking medications including plavix and aspirin.

## 2018-09-03 NOTE — ED ADULT NURSE NOTE - NSIMPLEMENTINTERV_GEN_ALL_ED
Implemented All Universal Safety Interventions:  [place] to call system. Call bell, personal items and telephone within reach. Instruct patient to call for assistance. Room bathroom lighting operational. Non-slip footwear when patient is off stretcher. Physically safe environment: no spills, clutter or unnecessary equipment. Stretcher in lowest position, wheels locked, appropriate side rails in place.

## 2018-09-03 NOTE — H&P ADULT - PROBLEM SELECTOR PLAN 2
- C/w ASA/Plavix daily   - Patient on Atenolol rather than cardio-specific BB for CAD/HTN - consider Co-reg? In reading prior notes, patient was instructed to hold off on atenolol given bradycardia.   - Patient on Lipitor 20mg per nocte rather than high dose for unclear reasons   - Was not d/noelle on ACEi/ARB in the past - additional BP agent likely required based on the BP trends in house.

## 2018-09-03 NOTE — H&P ADULT - PROBLEM SELECTOR PLAN 8
- Dr Hilario, Dr Jez Luo  1) PCP Contacted on Admission: (Y/N) --> Name & Phone #:  2) Date of Contact with PCP:  3) PCP Contacted at Discharge: (Y/N)  4) Summary of Handoff Given to PCP:   5) Post-Discharge Appointment Date and Location:

## 2018-09-03 NOTE — H&P ADULT - PROBLEM SELECTOR PLAN 6
F: No IVF req - will receive pre- and post- cath fluids per managing team   E: Replete PRN  N: DASH/TLC, NPO after [place] for Cardiac Cath   Prophylaxis: ASA, Plavix - start HSQ after cath, patient already on PPI  Dispo: [facility] - C/w Lipitor 20mg per nocte - clarify why patient isn't on a high dose statin for CAD.  - F/u AM Lipid Profile

## 2018-09-03 NOTE — H&P ADULT - PROBLEM SELECTOR PLAN 1
- Patient with significant history of coronary artery disease s/p PCI and midCAB in the past, most recent cardiac cath October 2017 illustrated patent stent, non-obstructive disease.   - Presenting with 4 days of exertional chest pain and shortness of breath similar to previous episodes of angina   - Reportedly compliant with home medications - ASA/Plavix/Lipitor   - Negative cardiac enzymes on admission but will continue to trend till (-) x 3   - EKG:  - Patient is to be NPO for cardiac catheterization tomorrow - Patient with significant history of coronary artery disease s/p PCI and midCAB in the past, most recent cardiac cath October 2017 w/ Dr Tang illustrated patent stent, non-obstructive disease.   - Presenting with 4 days of exertional chest pain and shortness of breath similar to previous episodes of angina   - Reportedly compliant with home medications - ASA/Plavix/Lipitor   - Negative cardiac enzymes on admission but will continue to trend till (-) x 3   - EKG: NSR - isolated T wave inversion lead III, Q waves inferior leads.   - Patient is to be NPO for cardiac catheterization tomorrow, now s/p ASA, Plavix load in the evening.

## 2018-09-03 NOTE — H&P ADULT - PROBLEM SELECTOR PLAN 7
- Dr Hilario, Dr Jez Luo  1) PCP Contacted on Admission: (Y/N) --> Name & Phone #:  2) Date of Contact with PCP:  3) PCP Contacted at Discharge: (Y/N)  4) Summary of Handoff Given to PCP:   5) Post-Discharge Appointment Date and Location: F: No IVF req - will receive pre- and post- cath fluids per managing team   E: Replete PRN  N: DASH/TLC, NPO after [place] for Cardiac Cath   Prophylaxis: ASA, Plavix - start HSQ after cath, patient already on PPI  Dispo: [facility]

## 2018-09-03 NOTE — ED ADULT NURSE NOTE - PMH
Patient tolerated injection's well.   
HLD (hyperlipidemia)    HTN (hypertension)    Myocardial infarction    Prostate CA    Stented coronary artery

## 2018-09-04 DIAGNOSIS — R00.1 BRADYCARDIA, UNSPECIFIED: ICD-10-CM

## 2018-09-04 LAB
ANION GAP SERPL CALC-SCNC: 12 MMOL/L — SIGNIFICANT CHANGE UP (ref 5–17)
APTT BLD: 26.6 SEC — LOW (ref 27.5–37.4)
BUN SERPL-MCNC: 22 MG/DL — SIGNIFICANT CHANGE UP (ref 7–23)
CALCIUM SERPL-MCNC: 10.1 MG/DL — SIGNIFICANT CHANGE UP (ref 8.4–10.5)
CHLORIDE SERPL-SCNC: 99 MMOL/L — SIGNIFICANT CHANGE UP (ref 96–108)
CHOLEST SERPL-MCNC: 143 MG/DL — SIGNIFICANT CHANGE UP (ref 10–199)
CK MB CFR SERPL CALC: 2.4 NG/ML — SIGNIFICANT CHANGE UP (ref 0–6.7)
CK SERPL-CCNC: 33 U/L — SIGNIFICANT CHANGE UP (ref 30–200)
CO2 SERPL-SCNC: 28 MMOL/L — SIGNIFICANT CHANGE UP (ref 22–31)
CREAT SERPL-MCNC: 1.23 MG/DL — SIGNIFICANT CHANGE UP (ref 0.5–1.3)
GLUCOSE SERPL-MCNC: 101 MG/DL — HIGH (ref 70–99)
HBA1C BLD-MCNC: 5.4 % — SIGNIFICANT CHANGE UP (ref 4–5.6)
HCT VFR BLD CALC: 42.2 % — SIGNIFICANT CHANGE UP (ref 39–50)
HDLC SERPL-MCNC: 59 MG/DL — SIGNIFICANT CHANGE UP
HGB BLD-MCNC: 14.3 G/DL — SIGNIFICANT CHANGE UP (ref 13–17)
INR BLD: 0.97 — SIGNIFICANT CHANGE UP (ref 0.88–1.16)
LIPID PNL WITH DIRECT LDL SERPL: 69 MG/DL — SIGNIFICANT CHANGE UP
MAGNESIUM SERPL-MCNC: 2 MG/DL — SIGNIFICANT CHANGE UP (ref 1.6–2.6)
MCHC RBC-ENTMCNC: 31.2 PG — SIGNIFICANT CHANGE UP (ref 27–34)
MCHC RBC-ENTMCNC: 33.9 G/DL — SIGNIFICANT CHANGE UP (ref 32–36)
MCV RBC AUTO: 91.9 FL — SIGNIFICANT CHANGE UP (ref 80–100)
PLATELET # BLD AUTO: 296 K/UL — SIGNIFICANT CHANGE UP (ref 150–400)
POTASSIUM SERPL-MCNC: 4.5 MMOL/L — SIGNIFICANT CHANGE UP (ref 3.5–5.3)
POTASSIUM SERPL-SCNC: 4.5 MMOL/L — SIGNIFICANT CHANGE UP (ref 3.5–5.3)
PROTHROM AB SERPL-ACNC: 10.8 SEC — SIGNIFICANT CHANGE UP (ref 9.8–12.7)
RBC # BLD: 4.59 M/UL — SIGNIFICANT CHANGE UP (ref 4.2–5.8)
RBC # FLD: 13.4 % — SIGNIFICANT CHANGE UP (ref 10.3–16.9)
SODIUM SERPL-SCNC: 139 MMOL/L — SIGNIFICANT CHANGE UP (ref 135–145)
TOTAL CHOLESTEROL/HDL RATIO MEASUREMENT: 2.4 RATIO — LOW (ref 3.4–9.6)
TRIGL SERPL-MCNC: 75 MG/DL — SIGNIFICANT CHANGE UP (ref 10–149)
TROPONIN T SERPL-MCNC: <0.01 NG/ML — SIGNIFICANT CHANGE UP (ref 0–0.01)
TSH SERPL-MCNC: 1.54 UIU/ML — SIGNIFICANT CHANGE UP (ref 0.35–4.94)
WBC # BLD: 8.8 K/UL — SIGNIFICANT CHANGE UP (ref 3.8–10.5)
WBC # FLD AUTO: 8.8 K/UL — SIGNIFICANT CHANGE UP (ref 3.8–10.5)

## 2018-09-04 PROCEDURE — 93010 ELECTROCARDIOGRAM REPORT: CPT

## 2018-09-04 PROCEDURE — 93458 L HRT ARTERY/VENTRICLE ANGIO: CPT | Mod: 26

## 2018-09-04 RX ORDER — CLOPIDOGREL BISULFATE 75 MG/1
75 TABLET, FILM COATED ORAL ONCE
Qty: 0 | Refills: 0 | Status: COMPLETED | OUTPATIENT
Start: 2018-09-04 | End: 2018-09-04

## 2018-09-04 RX ORDER — SODIUM CHLORIDE 9 MG/ML
500 INJECTION INTRAMUSCULAR; INTRAVENOUS; SUBCUTANEOUS
Qty: 0 | Refills: 0 | Status: COMPLETED | OUTPATIENT
Start: 2018-09-04 | End: 2018-09-04

## 2018-09-04 RX ORDER — SODIUM CHLORIDE 9 MG/ML
500 INJECTION INTRAMUSCULAR; INTRAVENOUS; SUBCUTANEOUS
Qty: 0 | Refills: 0 | Status: DISCONTINUED | OUTPATIENT
Start: 2018-09-04 | End: 2018-09-04

## 2018-09-04 RX ORDER — AMLODIPINE BESYLATE 2.5 MG/1
10 TABLET ORAL DAILY
Qty: 0 | Refills: 0 | Status: DISCONTINUED | OUTPATIENT
Start: 2018-09-04 | End: 2018-09-05

## 2018-09-04 RX ADMIN — SODIUM CHLORIDE 75 MILLILITER(S): 9 INJECTION INTRAMUSCULAR; INTRAVENOUS; SUBCUTANEOUS at 21:42

## 2018-09-04 RX ADMIN — SODIUM CHLORIDE 75 MILLILITER(S): 9 INJECTION INTRAMUSCULAR; INTRAVENOUS; SUBCUTANEOUS at 11:25

## 2018-09-04 RX ADMIN — ATORVASTATIN CALCIUM 20 MILLIGRAM(S): 80 TABLET, FILM COATED ORAL at 21:37

## 2018-09-04 RX ADMIN — PANTOPRAZOLE SODIUM 40 MILLIGRAM(S): 20 TABLET, DELAYED RELEASE ORAL at 07:09

## 2018-09-04 RX ADMIN — CLOPIDOGREL BISULFATE 75 MILLIGRAM(S): 75 TABLET, FILM COATED ORAL at 17:03

## 2018-09-04 RX ADMIN — SODIUM CHLORIDE 75 MILLILITER(S): 9 INJECTION INTRAMUSCULAR; INTRAVENOUS; SUBCUTANEOUS at 19:30

## 2018-09-04 RX ADMIN — Medication 81 MILLIGRAM(S): at 11:14

## 2018-09-04 RX ADMIN — AMLODIPINE BESYLATE 10 MILLIGRAM(S): 2.5 TABLET ORAL at 07:09

## 2018-09-04 NOTE — PROGRESS NOTE ADULT - PROBLEM SELECTOR PLAN 7
F: No IVF req - will receive pre- and post- cath fluids per managing team   E: Replete PRN  N: DASH/TLC, NPO after [place] for Cardiac Cath   Prophylaxis: ASA, Plavix - start HSQ after cath, patient already on PPI  Dispo: [facility] F: Pre-cath IV hydration NS 75cc x 5hrs  E: Replete PRN K<4, Mg<2  N: DASH/TLC, NPO pending Cardiac Cath   Prophylaxis: start HSQ after cath, patient already on PPI  Dispo: [facility]

## 2018-09-04 NOTE — PROGRESS NOTE ADULT - SUBJECTIVE AND OBJECTIVE BOX
Interventional Cardiology PA Precath Note      HPI:  86 y/o male with a PMhx of brain bleed??? (CONFRM), stage 3 CKD, HTN, Prostate Ca s/p radiation, known CAD s/p midCAB (3/2017 [facility]) and most recent cardiac catheterization (10/2017) revealing moderate non obstructive CAD (50% IRSR OM1, 40% mRCA ISR, LIMA to LAD patent), presented to [facility] ED (9/3/2018) with 4 day history of exertional chest pain and shortness of breath. In the ED, vital signs were stable, Troponin negative x1, BUN/CR 23/1.46, , CXR clear and 12 Lead EKG revealed (INSERT). Patient was subsequently admitted to [facility] for ACS protocol and troponin negative x3. Patient now presents for cardiac catheterization with possible intervention if clinically indicate due to unstable angina, known CAD and patient’s additional risk factors.     Prior Cardiac Testing   s/p cardiac catheterization (10/3/2017): LM luminal irregularities, pLAD 70% diffuse disease, mLAD ISR, 50% ISR OM1, 40% prox-mid RCA ISR, LIMA to LAD patent, LVEF 55%, normal LVEDP. Impression: Moderate non obstructive CAD, patent LIMA to LAD graft    Prior Echo (2017): mild LVH, basal inferior wall hypokinesis, LVEF 60%, mildly elevated left atrial pressure, mild MR.        PMH:  as above  PSH:  MIDCAB 2017, s/p hernia repair, s/p cholecystectomy   ALL: NKDA, NKFA. Denies shellfish/Contrast dye allergy.  SocHX: Denies EtoH/TOB/IVDU  FHx: No significant PMHX  MEDS:   amLODIPine   Tablet 10 milliGRAM(s) Oral daily  aspirin enteric coated 81 milliGRAM(s) Oral daily  atorvastatin 20 milliGRAM(s) Oral at bedtime  clopidogrel Tablet 75 milliGRAM(s) Oral daily  influenza   Vaccine 0.5 milliLiter(s) IntraMuscular once  pantoprazole    Tablet 40 milliGRAM(s) Oral before breakfast  polyethylene glycol 3350 17 Gram(s) Oral at bedtime    T(C): 37.6 (09-04-18 @ 10:43), Max: 37.6 (09-04-18 @ 10:43)  HR: 56 (09-04-18 @ 09:03) (52 - 70)  BP: 144/68 (09-04-18 @ 09:03) (119/46 - 185/71)  RR: 16 (09-04-18 @ 09:03) (15 - 18)  SpO2: 98% (09-04-18 @ 09:03) (96% - 99%)  Wt(kg): --  HEENT: NCAT, EOMI, PERRLA  NECK: No JVD, No carotid bruits B/L, +2 Carotid pulses B/L  PULM:  CTA B/L No W/R/R  CARD: RRR, +S1, +S2, No M/R/G  ABD: ND, +BS, NT, no masses  EXT: Warm, pedal edema yes/no, pitting/non-pitting  RECTAL: Guaiac negative/positive   NEURO: A & O x 3, no focal neurologic deficits  PULSES:	B	    R	      FEM         	                                            DP                          PT  Right		                                                  Yes/No     Bruit	    Left		                                                  Yes/No     Bruit                                14.3   8.8   )-----------( 296      ( 04 Sep 2018 05:36 )             42.2     09-04    139  |  99  |  22  ----------------------------<  101<H>  4.5   |  28  |  1.23    Ca    10.1      04 Sep 2018 05:36  Mg     2.0     09-04    TPro  6.4  /  Alb  3.7  /  TBili  0.2  /  DBili  x   /  AST  16  /  ALT  18  /  AlkPhos  87  09-03    CARDIAC MARKERS ( 04 Sep 2018 05:36 )  x     / <0.01 ng/mL / 33 U/L / x     / 2.4 ng/mL  CARDIAC MARKERS ( 03 Sep 2018 22:45 )  x     / <0.01 ng/mL / 31 U/L / x     / 2.3 ng/mL  CARDIAC MARKERS ( 03 Sep 2018 16:52 )  x     / <0.01 ng/mL / x     / x     / x            EKG:					  ASA _____				Mallampati class: _________	            Anginal Class: _________  A/P:        Sedation Plan:    Moderate      Patient Is Suitable Candidate For Sedation?       Yes       Risks & benefits of procedure and sedation and risks and benefits for the alternative therapy have been explained to the patient in layman’s terms including but not limited to: allergic reaction, bleeding, infection, arrhythmia, respiratory compromise, renal and vascular compromise, limb damage, MI, CVA, emergent CABG/Vascular Surgery and death. Informed consent obtained and in chart. Interventional Cardiology PA Precath Note      HPI:   86 y/o male with a PMhx of stage 3 CKD, HTN, Prostate Ca s/p radiation, known CAD s/p midCAB (3/2017 [facility]) and most recent cardiac catheterization (10/2017) revealing moderate non obstructive CAD (50% IRR OM1, 40% mRCA ISR, LIMA to LAD patent) presented to [facility] ED (9/3/2018) with 4 days of substernal chest burning radiating to his throat associated with SOB x one week. Patient reports he experienced similar symptoms prior to PCIs in past.  In the ED, vital signs were stable, Troponin negative x1, BUN/CR 23/1.46, , CXR clear and 12 lead EKG revealed Sinus Rhythm with ST flattening in II, AVF, V5-V6, TWI III. Patient was subsequently admitted to [facility] for ACS protocol and troponin negative x3. Patient now presents for cardiac catheterization with possible intervention if clinically indicate due to unstable angina, known CAD and patient’s additional risk factors.   -Of note: Patient reports compliance with ASA/Plavix regimen at home and was loaded with Plavix 600 mg in ED.  -In addition, in H+P it was documented patient reported history of brain bleed. After long discussion with patient; patient had two neurology evaluations recently for right sided headache and had MRI done and was told the treatment was to take ASA/Plavix.    Prior Cardiac Testing   s/p cardiac catheterization (10/3/2017): LM luminal irregularities, pLAD 70% diffuse disease, mLAD ISR, 50% ISR OM1, 40% prox-mid RCA ISR, LIMA to LAD patent, LVEF 55%, normal LVEDP. Impression: Moderate non obstructive CAD, patent LIMA to LAD graft    Prior Echo (2017): mild LVH, basal inferior wall hypokinesis, LVEF 60%, mildly elevated left atrial pressure, mild MR.          PMH:  as above  PSH:  MIDCAB 2017, s/p hernia repair, s/p cholecystectomy   ALL: NKDA, NKFA. Denies shellfish/Contrast dye allergy.  SocHX: Denies EtoH/TOB/IVDU  FHx: No significant PMHX  MEDS:   amLODIPine   Tablet 10 milliGRAM(s) Oral daily  aspirin enteric coated 81 milliGRAM(s) Oral daily  atorvastatin 20 milliGRAM(s) Oral at bedtime  clopidogrel Tablet 75 milliGRAM(s) Oral daily  influenza   Vaccine 0.5 milliLiter(s) IntraMuscular once  pantoprazole    Tablet 40 milliGRAM(s) Oral before breakfast  polyethylene glycol 3350 17 Gram(s) Oral at bedtime    T(C): 37.6 (09-04-18 @ 10:43), Max: 37.6 (09-04-18 @ 10:43)  HR: 56 (09-04-18 @ 09:03) (52 - 70)  BP: 144/68 (09-04-18 @ 09:03) (119/46 - 185/71)  RR: 16 (09-04-18 @ 09:03) (15 - 18)  SpO2: 98% (09-04-18 @ 09:03) (96% - 99%)  Wt(kg): --    Gen: NAD, resting comfortable in bed  Neck: No JVD B/l  Cardiac: +S1, S2, RRR  Pulm: CTA b/l  Abdomen: BS present, soft, NT, ND  Extremities: No C/C/E b/l  Neuro: A+Ox3.     PULSES:	B	    R	      FEM        DP                          PT  Right		    2+          2+           2+                         2+  Left		    2+          2+           1+                         2+                                 14.3   8.8   )-----------( 296      ( 04 Sep 2018 05:36 )             42.2     09-04    139  |  99  |  22  ----------------------------<  101<H>  4.5   |  28  |  1.23    Ca    10.1      04 Sep 2018 05:36  Mg     2.0     09-04    TPro  6.4  /  Alb  3.7  /  TBili  0.2  /  DBili  x   /  AST  16  /  ALT  18  /  AlkPhos  87  09-03    CARDIAC MARKERS ( 04 Sep 2018 05:36 )  x     / <0.01 ng/mL / 33 U/L / x     / 2.4 ng/mL  CARDIAC MARKERS ( 03 Sep 2018 22:45 )  x     / <0.01 ng/mL / 31 U/L / x     / 2.3 ng/mL  CARDIAC MARKERS ( 03 Sep 2018 16:52 )  x     / <0.01 ng/mL / x     / x     / x          					  ASA II			Mallampati class: II	            Anginal Class: Unstable angina   A/P:    86 y/o male with a PMhx of stage 3 CKD, HTN, Prostate Ca s/p radiation, known CAD s/p midCAB (3/2017 [facility]) and most recent cardiac catheterization (10/2017) revealing moderate non obstructive CAD (50% IRR OM1, 40% mRCA ISR, LIMA to LAD patent) presented to [facility] ED (9/3/2018) with unstable angina with plan for cardiac catheterization with possible intervention if clinically indicated.   -Patient was loaded with Plavix 600 mg PO x one dose in ED and remains complaint with ASA 81 mg daily and Plavix 75 mg daily at home  -Base on trend of renal function in past (Sunrise values) patient with underlying CKD stage III. Will start NS @ 75 cc/hour now (11:30 AM) until cardiac catheterization as discussed with 5Lachman NP.   -Case will be after 3 PM and team has been notified patient may eat a light lunch.         Sedation Plan:    Moderate      Patient Is Suitable Candidate For Sedation?       Yes       Risks & benefits of procedure and sedation and risks and benefits for the alternative therapy have been explained to the patient in layman’s terms including but not limited to: allergic reaction, bleeding, infection, arrhythmia, respiratory compromise, renal and vascular compromise, limb damage, MI, CVA, emergent CABG/Vascular Surgery and death. Informed consent obtained and in chart.

## 2018-09-04 NOTE — PROGRESS NOTE ADULT - PROBLEM SELECTOR PLAN 2
- C/w ASA/Plavix daily   - Patient on Atenolol rather than cardio-specific BB for CAD/HTN - consider Co-reg? In reading prior notes, patient was instructed to hold off on atenolol given bradycardia.   - Patient on Lipitor 20mg per nocte rather than high dose for unclear reasons   - Was not d/noelle on ACEi/ARB in the past - additional BP agent likely required based on the BP trends in house. - C/w ASA 81/Plavix 75 daily   - c/w Lipitor 20mg qd  - Patient on Atenolol 25 qd rather than cardio-specific BB for CAD/HTN - consider Coreg vs Metoprolol? In reading prior notes, patient was instructed to hold off on atenolol given bradycardia.

## 2018-09-04 NOTE — PROGRESS NOTE ADULT - ASSESSMENT
84 y/o male with HTN, Prostate Ca s/p radiation, known CAD s/p PCI in 2016 [NARESH OM1 & PTCA midLAD] and midCAB June 2017, most recent cardiac catheterization with Dr Tang 10/17 - diagnostic, no intervention performed - presents with a 4 day history of exertional chest pain and shortness of breath. 86 y/o male with HTN, CKD stage 3, Prostate CA s/p radiation, known CAD s/p PCI in 2016 [NARESH OM1 & PTCA midLAD] and midCAB June 2017, most recent cardiac catheterization with Dr Tang 10/17 - diagnostic, no intervention performed - presents with a 4 day history of exertional chest pain and shortness of breath. 84 y/o male with HTN, CKD stage 3, Prostate CA s/p radiation, known CAD s/p PCI in 2016 [NARESH OM1 & PTCA midLAD] and midCAB 5/2017, known CAD s/p midCAB (3/2017 [facility]) and most recent cardiac catheterization (10/2017) revealing moderate non obstructive CAD (50% IRR OM1, 40% mRCA ISR, LIMA to LAD patent)  - presents with a 4 day history of exertional chest pain and shortness of breath. Admitted to tele 5 Lachman r/o ACS. 86 y/o male with HTN, CKD stage 3, Prostate CA s/p radiation, known CAD s/p PCI in 2016 [NARESH OM1 & PTCA midLAD] and midCAB 5/2017, known CAD s/p midCAB (3/2017 [facility]) and most recent cardiac catheterization (10/2017) revealing moderate non obstructive CAD (50% IRR OM1, 40% mRCA ISR, LIMA to LAD patent) - presents with a 4 day history of exertional chest pain and shortness of breath. Admitted to tele 5 Lachman r/o ACS.

## 2018-09-04 NOTE — PROGRESS NOTE ADULT - PROBLEM SELECTOR PLAN 6
- C/w Lipitor 20mg per nocte - clarify why patient isn't on a high dose statin for CAD.  - F/u AM Lipid Profile T Chol 143, Tri 75, HDL 59, LDL 69  -C/w Lipitor 20mg qd

## 2018-09-04 NOTE — PROGRESS NOTE ADULT - SUBJECTIVE AND OBJECTIVE BOX
CARDIOLOGY NP PROGRESS NOTE    Subjective: Pt seen and examined at bedside. Reports exertional chest discomfort yesterday. No chest pain today. Denies sob, lightheadedness, dizziness, palpitations n/v, diaphoresis.  Remainder ROS otherwise negative.    Overnight Events: Trop neg x 3    TELEMETRY: SB 40- SR 60s    EKG: SB 50s, LVH, no acute ischemic changes (unchanged)      VITAL SIGNS:  T(C): 37.6 (09-04-18 @ 10:43), Max: 37.6 (09-04-18 @ 10:43)  HR: 56 (09-04-18 @ 09:03) (52 - 70)  BP: 144/68 (09-04-18 @ 09:03) (119/46 - 185/71)  RR: 16 (09-04-18 @ 09:03) (15 - 18)  SpO2: 98% (09-04-18 @ 09:03) (96% - 99%)  Wt(kg): --    I&O's Summary    03 Sep 2018 07:01  -  04 Sep 2018 07:00  --------------------------------------------------------  IN: 200 mL / OUT: 1400 mL / NET: -1200 mL    04 Sep 2018 07:01  -  04 Sep 2018 11:43  --------------------------------------------------------  IN: 150 mL / OUT: 210 mL / NET: -60 mL          PHYSICAL EXAM:    General: A/ox 3, No acute Distress  Neck: Supple, NO JVD  Cardiac: S1 S2, No M/R/G  Pulmonary: CTAB, Breathing unlabored, No Rhonchi/Rales/Wheezing  Abdomen: Soft, Non -tender, +BS x 4 quads  Extremities: No Rashes, No edema  Neuro: A/o x 3, No focal deficits          LABS:                          14.3   8.8   )-----------( 296      ( 04 Sep 2018 05:36 )             42.2                              09-04    139  |  99  |  22  ----------------------------<  101<H>  4.5   |  28  |  1.23    Ca    10.1      04 Sep 2018 05:36  Mg     2.0     09-04    TPro  6.4  /  Alb  3.7  /  TBili  0.2  /  DBili  x   /  AST  16  /  ALT  18  /  AlkPhos  87  09-03    LIVER FUNCTIONS - ( 03 Sep 2018 16:52 )  Alb: 3.7 g/dL / Pro: 6.4 g/dL / ALK PHOS: 87 U/L / ALT: 18 U/L / AST: 16 U/L / GGT: x                                 PT/INR - ( 04 Sep 2018 05:36 )   PT: 10.8 sec;   INR: 0.97          PTT - ( 04 Sep 2018 05:36 )  PTT:26.6 sec  CAPILLARY BLOOD GLUCOSE        CARDIAC MARKERS ( 04 Sep 2018 05:36 )  x     / <0.01 ng/mL / 33 U/L / x     / 2.4 ng/mL  CARDIAC MARKERS ( 03 Sep 2018 22:45 )  x     / <0.01 ng/mL / 31 U/L / x     / 2.3 ng/mL  CARDIAC MARKERS ( 03 Sep 2018 16:52 )  x     / <0.01 ng/mL / x     / x     / x              Allergies:  No Known Allergies    MEDICATIONS  (STANDING):  amLODIPine   Tablet 10 milliGRAM(s) Oral daily  aspirin enteric coated 81 milliGRAM(s) Oral daily  atorvastatin 20 milliGRAM(s) Oral at bedtime  clopidogrel Tablet 75 milliGRAM(s) Oral daily  influenza   Vaccine 0.5 milliLiter(s) IntraMuscular once  pantoprazole    Tablet 40 milliGRAM(s) Oral before breakfast  polyethylene glycol 3350 17 Gram(s) Oral at bedtime  sodium chloride 0.9%. 500 milliLiter(s) (75 mL/Hr) IV Continuous <Continuous>    MEDICATIONS  (PRN):        DIAGNOSTIC TESTS:

## 2018-09-04 NOTE — PROGRESS NOTE ADULT - PROBLEM SELECTOR PLAN 4
- HR in the 40-50 range, patient takes Atenolol 25mg OD for HTN  - In reading prior notes, patient was meant to have stopped this medication given bradycardia   - Have d/noelle it at this time - can re-adjust meds as needed - if beta-blocker tolerated, possibly Co-reg? - HR in the 40-50 range, patient takes Atenolol 25mg qd for HTN  - In reading prior notes, patient was meant to have stopped this medication given bradycardia  -HOLD BB for now pending cardiac cath results

## 2018-09-04 NOTE — PROGRESS NOTE ADULT - PROBLEM SELECTOR PLAN 5
- Patient on Atenolol 25mg OD (perhaps subject to change to cardio-specific BB?)  - Norvasc 10mg, taken in the evening   - -180s thus far - will likely need addl agent, can consider ACEi/ARB if no contra-indications when RENÉE resolves. -160s  - c/w Norvasc 10mg qd  - HOLD Atenolol 25mg qd 2/2 bradycardia HR 40-60s (perhaps subject to change to cardio-specific BB?)

## 2018-09-04 NOTE — PROGRESS NOTE ADULT - PROBLEM SELECTOR PLAN 3
- Cr 1.46 (mild RENÉE) when baseline appears to be around 1   - Post-renal based on urine lytes though patient has been urinating appropriately with no evidence of obstruction   - Patient does have a history of Prostate Ca s/p radiation   - Avoid Nephrotoxic Agents at this time on CKD stage 3. Cr 1.46 on admission (mild RENÉE), when baseline appears to be around 1. Hx of Prostate CA s/p radiation.  -Crea improved to 1.23 today  - Urine lytes post-renal etiology, although patient has been urinating appropriately with no evidence of obstruction   - Avoid Nephrotoxic Agents at this time  -Renally dose meds

## 2018-09-04 NOTE — PROGRESS NOTE ADULT - PROBLEM SELECTOR PLAN 1
- Patient with significant history of coronary artery disease s/p PCI and midCAB in the past, most recent cardiac cath October 2017 w/ Dr Tang illustrated patent stent, non-obstructive disease.   - Presenting with 4 days of exertional chest pain and shortness of breath similar to previous episodes of angina   - Reportedly compliant with home medications - ASA/Plavix/Lipitor   - Negative cardiac enzymes on admission but will continue to trend till (-) x 3   - EKG: NSR - isolated T wave inversion lead III, Q waves inferior leads.   - Patient is to be NPO for cardiac catheterization tomorrow, now s/p ASA, Plavix load in the evening. Patient with significant history of CAD s/p PCI x2 and midCAB in the past, most recent cardiac cath October 2017 w/ Dr Tang illustrated patent stent, non-obstructive disease.   - Presenting with 4 days of exertional chest pain and shortness of breath similar to previous episodes of angina   - Reportedly compliant with home medications - ASA/Plavix/Lipitor   - Negative cardiac enzymes on admission but will continue to trend till (-) x 3   - EKG: NSR - isolated T wave inversion lead III, Q waves inferior leads.   - Patient is to be NPO for cardiac catheterization tomorrow, now s/p ASA, Plavix load in the evening. Patient with significant history of CAD s/p PCI in 2016 [NARESH OM1 & PTCA midLAD] and midCAB 5/2017, s/p midCAB (3/2017 [facility]) and most recent cardiac catheterization (10/2017) revealing moderate non obstructive CAD (50% IRR OM1, 40% mRCA ISR, LIMA to LAD patent).  - Presenting with 4 days of exertional chest pain and shortness of breath similar to previous episodes of angina   - Reportedly compliant with home medications - ASA/Plavix/Lipitor 20 qd  - Negative cardiac enzymes x3  - EKG w/ SB 50s, LVH (unchanged from previous)  - Patient is to be NPO for cardiac catheterization today  -s/p ASA, Plavix load last night.   -C/w ASA 81mg qd, Plavix 75mg qd Patient with significant history of CAD s/p PCI in 2016 [NARESH OM1 & PTCA midLAD] and midCAB 5/2017, s/p midCAB (3/2017 [facility]) and most recent cardiac catheterization (10/2017) revealing moderate non obstructive CAD (50% IRR OM1, 40% mRCA ISR, LIMA to LAD patent).  - Presenting with 4 days of exertional chest pain and shortness of breath similar to previous episodes of angina   - Reportedly compliant with home medications - ASA/Plavix  - Negative cardiac enzymes x3  - EKG w/ SB 50s, LVH (unchanged from previous)  - Pending cardiac catheterization today w/ Dr Tang  -s/p ASA/Plavix load last night  -C/w ASA 81mg qd, Plavix 75mg qd  -c/w Lipitor 20mg qd  - Patient on Atenolol 25 qd rather than cardio-specific BB for CAD/HTN - consider Coreg vs Metoprolol? In reading prior notes, patient was instructed to hold off on atenolol given bradycardia.

## 2018-09-05 ENCOUNTER — TRANSCRIPTION ENCOUNTER (OUTPATIENT)
Age: 83
End: 2018-09-05

## 2018-09-05 VITALS — TEMPERATURE: 98 F

## 2018-09-05 LAB
ANION GAP SERPL CALC-SCNC: 10 MMOL/L — SIGNIFICANT CHANGE UP (ref 5–17)
BUN SERPL-MCNC: 21 MG/DL — SIGNIFICANT CHANGE UP (ref 7–23)
CALCIUM SERPL-MCNC: 9.6 MG/DL — SIGNIFICANT CHANGE UP (ref 8.4–10.5)
CHLORIDE SERPL-SCNC: 94 MMOL/L — LOW (ref 96–108)
CO2 SERPL-SCNC: 31 MMOL/L — SIGNIFICANT CHANGE UP (ref 22–31)
CREAT SERPL-MCNC: 1.22 MG/DL — SIGNIFICANT CHANGE UP (ref 0.5–1.3)
GLUCOSE SERPL-MCNC: 105 MG/DL — HIGH (ref 70–99)
HCT VFR BLD CALC: 40.9 % — SIGNIFICANT CHANGE UP (ref 39–50)
HGB BLD-MCNC: 13.9 G/DL — SIGNIFICANT CHANGE UP (ref 13–17)
MAGNESIUM SERPL-MCNC: 1.8 MG/DL — SIGNIFICANT CHANGE UP (ref 1.6–2.6)
MCHC RBC-ENTMCNC: 30.4 PG — SIGNIFICANT CHANGE UP (ref 27–34)
MCHC RBC-ENTMCNC: 34 G/DL — SIGNIFICANT CHANGE UP (ref 32–36)
MCV RBC AUTO: 89.5 FL — SIGNIFICANT CHANGE UP (ref 80–100)
PLATELET # BLD AUTO: 256 K/UL — SIGNIFICANT CHANGE UP (ref 150–400)
POTASSIUM SERPL-MCNC: 4.4 MMOL/L — SIGNIFICANT CHANGE UP (ref 3.5–5.3)
POTASSIUM SERPL-SCNC: 4.4 MMOL/L — SIGNIFICANT CHANGE UP (ref 3.5–5.3)
RBC # BLD: 4.57 M/UL — SIGNIFICANT CHANGE UP (ref 4.2–5.8)
RBC # FLD: 13 % — SIGNIFICANT CHANGE UP (ref 10.3–16.9)
SODIUM SERPL-SCNC: 135 MMOL/L — SIGNIFICANT CHANGE UP (ref 135–145)
WBC # BLD: 8.1 K/UL — SIGNIFICANT CHANGE UP (ref 3.8–10.5)
WBC # FLD AUTO: 8.1 K/UL — SIGNIFICANT CHANGE UP (ref 3.8–10.5)

## 2018-09-05 PROCEDURE — 85025 COMPLETE CBC W/AUTO DIFF WBC: CPT

## 2018-09-05 PROCEDURE — 84484 ASSAY OF TROPONIN QUANT: CPT

## 2018-09-05 PROCEDURE — 93306 TTE W/DOPPLER COMPLETE: CPT | Mod: 26

## 2018-09-05 PROCEDURE — C1887: CPT

## 2018-09-05 PROCEDURE — 83735 ASSAY OF MAGNESIUM: CPT

## 2018-09-05 PROCEDURE — 71045 X-RAY EXAM CHEST 1 VIEW: CPT

## 2018-09-05 PROCEDURE — 80061 LIPID PANEL: CPT

## 2018-09-05 PROCEDURE — C1894: CPT

## 2018-09-05 PROCEDURE — 82553 CREATINE MB FRACTION: CPT

## 2018-09-05 PROCEDURE — 85730 THROMBOPLASTIN TIME PARTIAL: CPT

## 2018-09-05 PROCEDURE — 83880 ASSAY OF NATRIURETIC PEPTIDE: CPT

## 2018-09-05 PROCEDURE — 99285 EMERGENCY DEPT VISIT HI MDM: CPT | Mod: 25

## 2018-09-05 PROCEDURE — 83036 HEMOGLOBIN GLYCOSYLATED A1C: CPT

## 2018-09-05 PROCEDURE — 84443 ASSAY THYROID STIM HORMONE: CPT

## 2018-09-05 PROCEDURE — 80048 BASIC METABOLIC PNL TOTAL CA: CPT

## 2018-09-05 PROCEDURE — 85027 COMPLETE CBC AUTOMATED: CPT

## 2018-09-05 PROCEDURE — 84300 ASSAY OF URINE SODIUM: CPT

## 2018-09-05 PROCEDURE — 36415 COLL VENOUS BLD VENIPUNCTURE: CPT

## 2018-09-05 PROCEDURE — C1889: CPT

## 2018-09-05 PROCEDURE — 90686 IIV4 VACC NO PRSV 0.5 ML IM: CPT

## 2018-09-05 PROCEDURE — 80053 COMPREHEN METABOLIC PANEL: CPT

## 2018-09-05 PROCEDURE — 82570 ASSAY OF URINE CREATININE: CPT

## 2018-09-05 PROCEDURE — 93306 TTE W/DOPPLER COMPLETE: CPT

## 2018-09-05 PROCEDURE — 82550 ASSAY OF CK (CPK): CPT

## 2018-09-05 PROCEDURE — C1760: CPT

## 2018-09-05 PROCEDURE — C1769: CPT

## 2018-09-05 PROCEDURE — 85610 PROTHROMBIN TIME: CPT

## 2018-09-05 PROCEDURE — 84540 ASSAY OF URINE/UREA-N: CPT

## 2018-09-05 PROCEDURE — 93005 ELECTROCARDIOGRAM TRACING: CPT

## 2018-09-05 RX ORDER — MAGNESIUM SULFATE 500 MG/ML
2 VIAL (ML) INJECTION ONCE
Qty: 0 | Refills: 0 | Status: COMPLETED | OUTPATIENT
Start: 2018-09-05 | End: 2018-09-05

## 2018-09-05 RX ADMIN — Medication 81 MILLIGRAM(S): at 10:56

## 2018-09-05 RX ADMIN — AMLODIPINE BESYLATE 10 MILLIGRAM(S): 2.5 TABLET ORAL at 10:55

## 2018-09-05 RX ADMIN — PANTOPRAZOLE SODIUM 40 MILLIGRAM(S): 20 TABLET, DELAYED RELEASE ORAL at 06:13

## 2018-09-05 RX ADMIN — INFLUENZA VIRUS VACCINE 0.5 MILLILITER(S): 15; 15; 15; 15 SUSPENSION INTRAMUSCULAR at 10:56

## 2018-09-05 RX ADMIN — Medication 50 GRAM(S): at 09:04

## 2018-09-05 RX ADMIN — CLOPIDOGREL BISULFATE 75 MILLIGRAM(S): 75 TABLET, FILM COATED ORAL at 10:56

## 2018-09-05 NOTE — DISCHARGE NOTE ADULT - ADDITIONAL INSTRUCTIONS
1. Please follow up with your cardiologist Dr. Hilario within 2 weeks of discharge, call to schedule an appointment   Shravan Hilario  [id number] [address]  Phone: [phone]

## 2018-09-05 NOTE — DISCHARGE NOTE ADULT - HOSPITAL COURSE
85 y/oM PMHx HTN, Prostate Ca s/p radiation, known CAD s/p PCI in 2016 [NARESH OM1 & PTCA midLAD] and midCAB May 31, 2017, most recent cardiac catheterization with Dr Tang 10/17 - diagnostic, no intervention performed - presents with a 4 day history of exertional chest pain and shortness of breath. In the ED, vitals: T 97.8F | HR 57-63bpm | -172/76-85mmHg | RR 18 | SpO2 98-99% on RA. Labs s/f: Cr 1.46, Negative Cardiac Enzymes, BNP ~ 500. CXR - clear. Patient was admitted to [facility] for further workup.   During hospitalization, Cardiac enzymes x 3 negative and EKG with SB 50s, LVH (unchanged from previous). He underwent a cardiac cath with Dr. Tang on 9/4 s/f non-obstructive CAD; prox and midRCA stents patent, prox OM1 stent patent, proxLAD stent 30% ISR, midLAD stent 70% ISR with competitive flow, LIMA to LAD atretic/patent, no LV gram secondary to reduced GFR, EDP 11mmHg. ECHO performed s/f   Of note, he has hx CKD, however, on admission creatinine 1.46 whereas baseline creatinine 1. Lytes c/w post-renal etiology, however, no evidence of obstruction and creatinine improved as is 1.22 today.   Right groin remains stable without evidence of bleeding or hematoma. He is hemodynamically stable and is to be discharged home today. 85 y/oM PMHx HTN, Prostate Ca s/p radiation, known CAD s/p PCI in 2016 [NARESH OM1 & PTCA midLAD] and midCAB May 31, 2017, most recent cardiac catheterization with Dr Tang 10/17 - diagnostic, no intervention performed - presents with a 4 day history of exertional chest pain and shortness of breath. In the ED, vitals: T 97.8F | HR 57-63bpm | -172/76-85mmHg | RR 18 | SpO2 98-99% on RA. Labs s/f: Cr 1.46, Negative Cardiac Enzymes, BNP ~ 500. CXR - clear. Patient was admitted to [facility] for further workup.   During hospitalization, Cardiac enzymes x 3 negative and EKG with SB 50s, LVH (unchanged from previous). He underwent a cardiac cath with Dr. Tang on 9/4 s/f non-obstructive CAD; prox and midRCA stents patent, prox OM1 stent patent, proxLAD stent 30% ISR, midLAD stent 70% ISR with competitive flow, LIMA to LAD atretic/patent, no LV gram secondary to reduced GFR, EDP 11mmHg. ECHO performed with EF 65% and normal RV size and function, mild concentric LVH.   Of note, he has hx CKD, however, on admission creatinine 1.46 whereas baseline creatinine 1. Lytes c/w post-renal etiology, however, no evidence of obstruction and creatinine improved as is 1.22 today.   Right groin remains stable without evidence of bleeding or hematoma. He is hemodynamically stable and is to be discharged home today. 85 y/oM PMHx HTN, Prostate Ca s/p radiation, known CAD s/p PCI in 2016 [NARESH OM1 & PTCA midLAD] and midCAB May 31, 2017, most recent cardiac catheterization with Dr Tang 10/17 - diagnostic, no intervention performed - presents with a 4 day history of exertional chest pain and shortness of breath. In the ED, vitals: T 97.8F | HR 57-63bpm | -172/76-85mmHg | RR 18 | SpO2 98-99% on RA. Labs s/f: Cr 1.46, Negative Cardiac Enzymes, BNP ~ 500. CXR - clear. Patient was admitted to [facility] for further workup.   During hospitalization, Cardiac enzymes x 3 negative and EKG with SB 50s, LVH (unchanged from previous). He underwent a cardiac cath with Dr. Tang on 9/4 s/f non-obstructive CAD; prox and midRCA stents patent, prox OM1 stent patent, proxLAD stent 30% ISR, midLAD stent 70% ISR with competitive flow, LIMA to LAD atretic/patent, no LV gram secondary to reduced GFR, EDP 11mmHg. ECHO performed with EF 65% and normal RV size and function, mild concentric LVH. Of note, he has hx CKD, however, on admission creatinine 1.46 whereas baseline creatinine 1. Lytes c/w post-renal etiology, however, no evidence of obstruction and creatinine improved as is 1.22 today. Right groin remains stable without evidence of bleeding or hematoma. He is hemodynamically stable and is to be discharged home today. 85 y/oM PMHx HTN, Prostate Ca s/p radiation, known CAD s/p PCI in 2016 [NARESH OM1 & PTCA midLAD] and midCAB May 31, 2017, most recent cardiac catheterization with Dr Tang 10/17 - diagnostic, no intervention performed - presents with a 4 day history of exertional chest pain and shortness of breath. In the ED, vitals: T 97.8F | HR 57-63bpm | -172/76-85mmHg | RR 18 | SpO2 98-99% on RA. Labs s/f: Cr 1.46, Negative Cardiac Enzymes, BNP ~ 500. CXR - clear. Patient was admitted to [facility] for further workup.   During hospitalization, Cardiac enzymes x 3 negative and EKG with SB 50s, LVH (unchanged from previous). He underwent a cardiac cath with Dr. Tang on 9/4 s/f non-obstructive CAD; prox and midRCA stents patent, prox OM1 stent patent, proxLAD stent 30% ISR, midLAD stent 70% ISR with competitive flow, LIMA to LAD atretic/patent, no LV gram secondary to reduced GFR, EDP 11mmHg. ECHO performed with EF 65% and normal RV size and function, mild concentric LVH. Of note, he has hx CKD, however, on admission creatinine 1.46 whereas baseline creatinine 1. Lytes c/w post-renal etiology, however, no evidence of obstruction and creatinine improved as is 1.22 today. He was also noted to be bradycardic to the 40s this admission (asymptomatic) for which the Atenolol was held. Right groin remains stable without evidence of bleeding or hematoma. He is hemodynamically stable and is to be discharged home today on ASA 81, Plavix 75 and Norvasc 10mg.

## 2018-09-05 NOTE — DISCHARGE NOTE ADULT - PATIENT PORTAL LINK FT
You can access the [facility] Patient Portal, offered by [facility], by registering with the following website: [email or web address]

## 2018-09-05 NOTE — DISCHARGE NOTE ADULT - MEDICATION SUMMARY - MEDICATIONS TO TAKE
I will START or STAY ON the medications listed below when I get home from the hospital:    Aspirin Enteric Coated 81 mg oral delayed release tablet  -- 1 tab(s) by mouth once a day  -- Indication: For CAD (coronary artery disease)    Lipitor 20 mg oral tablet  -- 1 tab(s) by mouth once a day (at bedtime)  -- Indication: For HLD (hyperlipidemia)    Plavix 75 mg oral tablet  -- 1 tab(s) by mouth once a day  -- Indication: For CAD (coronary artery disease)    Norvasc 10 mg oral tablet  -- 1 tab(s) by mouth once a day  -- Indication: For HTN (hypertension)    polyethylene glycol 3350 oral powder for reconstitution  -- 17 gram(s) by mouth once a day (at bedtime).  OTC  -- Indication: For Constipation     pantoprazole 20 mg oral delayed release tablet  -- 1 tab(s) by mouth once a day  -- Indication: For GERD

## 2018-09-05 NOTE — DISCHARGE NOTE ADULT - CARE PLAN
Principal Discharge DX:	Unstable angina  Goal:	Please follow up with your cardiologist Dr. Hilario within 2 weeks of discharge, call to schedule an appointment  Assessment and plan of treatment:	You presented to the hospital with complaints of chest pain and shortness of breath.  Blood work was drawn and an EKG was performed and you DID NOT have a heart attack. Due to your significant cardiac history a cardiac cath was performed with Dr. Tang. Your previous stents are PATENT and NO intervention was necessary. Please continue your medications as prescribed and follow up with Dr. Hilario within 2 weeks of discharge.  - NEVER MISS A DOSE OF ASPIRIN OR PLAVIX. IF YOU DO, YOU ARE AT RISK OF YOUR STENTS CLOSING AND HAVING A HEART ATTACK. DO NOT STOP THESE TWO MEDICATIONS UNLESS INSTRUCTED TO DO SO BY YOUR CARDIOLOGIST.   - Do NOT drive or operate hazardous machinery for 24 hours. Limit your physical activity for 24-48 hours. Do NOT engage in sports, heavy work or heavy lifting for 72 hours.   - You MAY shower BUT no TUB BATHS, HOT TUBS OR SWIMMING FOR 5 DAYS  - Your procedure was done through your right groin. If you observe flank bleeding from the puncture site, it is an emergency. Please put direct pressure on the site and go directly to the ER. Bleeding under the skin may also occur and a small "black and blue" may be expected. If the area appears to be expanding or swelling around the puncture site, apply manual compression and go immediately to the nearest ER. If your foot/leg becomes cool or blue and/or you are unable to move it, this must be treated as an emergency, go directly to the nearest ER. Look for signs of infection in the groin: fever, red streaking of the leg, obvious pus formation and pain.    - An echocardiogram or ultrasound of your heart was performed  Secondary Diagnosis:	HTN (hypertension)  Assessment and plan of treatment:	You have a history of high blood pressure.  Secondary Diagnosis:	RENÉE (acute kidney injury)  Goal:	Please follow up with your PCP within 2 weeks of discharge.  Assessment and plan of treatment:	You have a known history of chronic kidney disease. On admission to the hospital, your creatinine was higher than your baseline. Your creatinine has improved and there is no evidence of urinary retention or obstruction. Please continue your medications as prescribed and follow up with your PCP within 2 weeks of discharge. Principal Discharge DX:	Unstable angina  Goal:	Please follow up with your cardiologist Dr. Hilario within 2 weeks of discharge, call to schedule an appointment  Assessment and plan of treatment:	You presented to the hospital with complaints of chest pain and shortness of breath.  Blood work was drawn and an EKG was performed and you DID NOT have a heart attack. Due to your significant cardiac history a cardiac cath was performed with Dr. Tang. Your previous stents are PATENT and NO intervention was necessary. Please continue your medications as prescribed and follow up with Dr. Hilario within 2 weeks of discharge.  - NEVER MISS A DOSE OF ASPIRIN OR PLAVIX. IF YOU DO, YOU ARE AT RISK OF YOUR STENTS CLOSING AND HAVING A HEART ATTACK. DO NOT STOP THESE TWO MEDICATIONS UNLESS INSTRUCTED TO DO SO BY YOUR CARDIOLOGIST.   - Do NOT drive or operate hazardous machinery for 24 hours. Limit your physical activity for 24-48 hours. Do NOT engage in sports, heavy work or heavy lifting for 72 hours.   - You MAY shower BUT no TUB BATHS, HOT TUBS OR SWIMMING FOR 5 DAYS  - Your procedure was done through your right groin. If you observe flank bleeding from the puncture site, it is an emergency. Please put direct pressure on the site and go directly to the ER. Bleeding under the skin may also occur and a small "black and blue" may be expected. If the area appears to be expanding or swelling around the puncture site, apply manual compression and go immediately to the nearest ER. If your foot/leg becomes cool or blue and/or you are unable to move it, this must be treated as an emergency, go directly to the nearest ER. Look for signs of infection in the groin: fever, red streaking of the leg, obvious pus formation and pain.  - An echocardiogram or ultrasound of your heart was performed and showed a normal ejection fraction or pumping function of your heart without significant valvular disease.  Secondary Diagnosis:	HTN (hypertension)  Assessment and plan of treatment:	You have a history of high blood pressure.  Secondary Diagnosis:	RENÉE (acute kidney injury)  Goal:	Please follow up with your PCP within 2 weeks of discharge.  Assessment and plan of treatment:	You have a known history of chronic kidney disease. On admission to the hospital, your creatinine was higher than your baseline. Your creatinine has improved and there is no evidence of urinary retention or obstruction. Please continue your medications as prescribed and follow up with your PCP within 2 weeks of discharge. Principal Discharge DX:	Unstable angina  Goal:	Please follow up with your cardiologist Dr. Hilario within 2 weeks of discharge, call to schedule an appointment  Assessment and plan of treatment:	You presented to the hospital with complaints of chest pain and shortness of breath.  Blood work was drawn and an EKG was performed and you DID NOT have a heart attack. Due to your significant cardiac history a cardiac cath was performed with Dr. Tang. Your previous stents are PATENT and NO intervention was necessary. Please continue your medications as prescribed and follow up with Dr. Hilario within 2 weeks of discharge.  - NEVER MISS A DOSE OF ASPIRIN OR PLAVIX. IF YOU DO, YOU ARE AT RISK OF YOUR STENTS CLOSING AND HAVING A HEART ATTACK. DO NOT STOP THESE TWO MEDICATIONS UNLESS INSTRUCTED TO DO SO BY YOUR CARDIOLOGIST.   - Do NOT drive or operate hazardous machinery for 24 hours. Limit your physical activity for 24-48 hours. Do NOT engage in sports, heavy work or heavy lifting for 72 hours.   - You MAY shower BUT no TUB BATHS, HOT TUBS OR SWIMMING FOR 5 DAYS  - Your procedure was done through your right groin. If you observe flank bleeding from the puncture site, it is an emergency. Please put direct pressure on the site and go directly to the ER. Bleeding under the skin may also occur and a small "black and blue" may be expected. If the area appears to be expanding or swelling around the puncture site, apply manual compression and go immediately to the nearest ER. If your foot/leg becomes cool or blue and/or you are unable to move it, this must be treated as an emergency, go directly to the nearest ER. Look for signs of infection in the groin: fever, red streaking of the leg, obvious pus formation and pain.  - An echocardiogram or ultrasound of your heart was performed and showed a normal ejection fraction or pumping function of your heart without significant valvular disease.  Secondary Diagnosis:	HTN (hypertension)  Assessment and plan of treatment:	You have a history of high blood pressure. While you were in the hospital, your heart rate was noted to be slow. For this reason you are to STOP Atenolol and continue the Norvasc as prescribed.  Secondary Diagnosis:	RENÉE (acute kidney injury)  Goal:	Please follow up with your PCP within 2 weeks of discharge.  Assessment and plan of treatment:	You have a known history of chronic kidney disease. On admission to the hospital, your creatinine was higher than your baseline. Your creatinine has improved and there is no evidence of urinary retention or obstruction. Please continue your medications as prescribed and follow up with your PCP within 2 weeks of discharge.

## 2018-09-05 NOTE — DISCHARGE NOTE ADULT - PLAN OF CARE
Please follow up with your cardiologist Dr. Hilario within 2 weeks of discharge, call to schedule an appointment You presented to the hospital with complaints of chest pain and shortness of breath.  Blood work was drawn and an EKG was performed and you DID NOT have a heart attack. Due to your significant cardiac history a cardiac cath was performed with Dr. Tang. Your previous stents are PATENT and NO intervention was necessary. Please continue your medications as prescribed and follow up with Dr. Hilario within 2 weeks of discharge.  - NEVER MISS A DOSE OF ASPIRIN OR PLAVIX. IF YOU DO, YOU ARE AT RISK OF YOUR STENTS CLOSING AND HAVING A HEART ATTACK. DO NOT STOP THESE TWO MEDICATIONS UNLESS INSTRUCTED TO DO SO BY YOUR CARDIOLOGIST.   - Do NOT drive or operate hazardous machinery for 24 hours. Limit your physical activity for 24-48 hours. Do NOT engage in sports, heavy work or heavy lifting for 72 hours.   - You MAY shower BUT no TUB BATHS, HOT TUBS OR SWIMMING FOR 5 DAYS  - Your procedure was done through your right groin. If you observe flank bleeding from the puncture site, it is an emergency. Please put direct pressure on the site and go directly to the ER. Bleeding under the skin may also occur and a small "black and blue" may be expected. If the area appears to be expanding or swelling around the puncture site, apply manual compression and go immediately to the nearest ER. If your foot/leg becomes cool or blue and/or you are unable to move it, this must be treated as an emergency, go directly to the nearest ER. Look for signs of infection in the groin: fever, red streaking of the leg, obvious pus formation and pain.    - An echocardiogram or ultrasound of your heart was performed You have a history of high blood pressure. Please follow up with your PCP within 2 weeks of discharge. You have a known history of chronic kidney disease. On admission to the hospital, your creatinine was higher than your baseline. Your creatinine has improved and there is no evidence of urinary retention or obstruction. Please continue your medications as prescribed and follow up with your PCP within 2 weeks of discharge. You presented to the hospital with complaints of chest pain and shortness of breath.  Blood work was drawn and an EKG was performed and you DID NOT have a heart attack. Due to your significant cardiac history a cardiac cath was performed with Dr. Tang. Your previous stents are PATENT and NO intervention was necessary. Please continue your medications as prescribed and follow up with Dr. Hilario within 2 weeks of discharge.  - NEVER MISS A DOSE OF ASPIRIN OR PLAVIX. IF YOU DO, YOU ARE AT RISK OF YOUR STENTS CLOSING AND HAVING A HEART ATTACK. DO NOT STOP THESE TWO MEDICATIONS UNLESS INSTRUCTED TO DO SO BY YOUR CARDIOLOGIST.   - Do NOT drive or operate hazardous machinery for 24 hours. Limit your physical activity for 24-48 hours. Do NOT engage in sports, heavy work or heavy lifting for 72 hours.   - You MAY shower BUT no TUB BATHS, HOT TUBS OR SWIMMING FOR 5 DAYS  - Your procedure was done through your right groin. If you observe flank bleeding from the puncture site, it is an emergency. Please put direct pressure on the site and go directly to the ER. Bleeding under the skin may also occur and a small "black and blue" may be expected. If the area appears to be expanding or swelling around the puncture site, apply manual compression and go immediately to the nearest ER. If your foot/leg becomes cool or blue and/or you are unable to move it, this must be treated as an emergency, go directly to the nearest ER. Look for signs of infection in the groin: fever, red streaking of the leg, obvious pus formation and pain.  - An echocardiogram or ultrasound of your heart was performed and showed a normal ejection fraction or pumping function of your heart without significant valvular disease. You have a history of high blood pressure. While you were in the hospital, your heart rate was noted to be slow. For this reason you are to STOP Atenolol and continue the Norvasc as prescribed.

## 2018-09-05 NOTE — DISCHARGE NOTE ADULT - MEDICATION SUMMARY - MEDICATIONS TO STOP TAKING
I will STOP taking the medications listed below when I get home from the hospital:    ranolazine 500 mg oral tablet, extended release  -- 1 tab(s) by mouth 2 times a day    atenolol 25 mg oral tablet  -- 1 tab(s) by mouth once a day

## 2018-09-05 NOTE — DISCHARGE NOTE ADULT - PROVIDER TOKENS
FREE:[LAST:[Sulaiman],FIRST:[Shravan],PHONE:[phone]],FAX:[phone]    -],ADDRESS:[[address] [place] [address]]]

## 2018-09-11 DIAGNOSIS — Z95.5 PRESENCE OF CORONARY ANGIOPLASTY IMPLANT AND GRAFT: ICD-10-CM

## 2018-09-11 DIAGNOSIS — I25.110 ATHEROSCLEROTIC HEART DISEASE OF NATIVE CORONARY ARTERY WITH UNSTABLE ANGINA PECTORIS: ICD-10-CM

## 2018-09-11 DIAGNOSIS — R00.1 BRADYCARDIA, UNSPECIFIED: ICD-10-CM

## 2018-09-11 DIAGNOSIS — I25.2 OLD MYOCARDIAL INFARCTION: ICD-10-CM

## 2018-09-11 DIAGNOSIS — Z79.82 LONG TERM (CURRENT) USE OF ASPIRIN: ICD-10-CM

## 2018-09-11 DIAGNOSIS — N17.9 ACUTE KIDNEY FAILURE, UNSPECIFIED: ICD-10-CM

## 2018-09-11 DIAGNOSIS — Z92.3 PERSONAL HISTORY OF IRRADIATION: ICD-10-CM

## 2018-09-11 DIAGNOSIS — I12.9 HYPERTENSIVE CHRONIC KIDNEY DISEASE WITH STAGE 1 THROUGH STAGE 4 CHRONIC KIDNEY DISEASE, OR UNSPECIFIED CHRONIC KIDNEY DISEASE: ICD-10-CM

## 2018-09-11 DIAGNOSIS — Z95.1 PRESENCE OF AORTOCORONARY BYPASS GRAFT: ICD-10-CM

## 2018-09-11 DIAGNOSIS — Z87.891 PERSONAL HISTORY OF NICOTINE DEPENDENCE: ICD-10-CM

## 2018-09-11 DIAGNOSIS — Z85.46 PERSONAL HISTORY OF MALIGNANT NEOPLASM OF PROSTATE: ICD-10-CM

## 2018-09-11 DIAGNOSIS — N18.3 CHRONIC KIDNEY DISEASE, STAGE 3 (MODERATE): ICD-10-CM

## 2018-09-11 DIAGNOSIS — T44.7X5A ADVERSE EFFECT OF BETA-ADRENORECEPTOR ANTAGONISTS, INITIAL ENCOUNTER: ICD-10-CM

## 2018-10-02 ENCOUNTER — APPOINTMENT (OUTPATIENT)
Dept: CARDIOTHORACIC SURGERY | Facility: CLINIC | Age: 83
End: 2018-10-02

## 2018-11-05 NOTE — PRE-OP CHECKLIST - ANTIBIOTIC
[facility] Hematology/Oncology  PROGRESS NOTE      Subjective:       Patient ID:   NAME: Eugenia Darling : 1945     72 y.o. female    Referring Doc: Yoselyn  Other Physicians: Tammi Fernandez Trainor    Chief Complaint:  Breast ca f/u    History of Present Illness:     Patient returns today for a regularly scheduled follow-up visit.  The patient is here with her . She reports that she feels great.  She is doing ok with no new issues. She denies any CP, SOB, HA's or N/V. She had recent mammogram on 10/30/2018 which is reported as benign/negative. She saw Dr Miles with GI about the diarrhea and it was the krill oil that was doing it. It has since resolved.         ROS:   GEN: normal without any fever, night sweats or weight loss  HEENT: normal with no HA's, sore throat, stiff neck, changes in vision  CV: normal with no CP, SOB, PND, LEAVITT or orthopnea  PULM: normal with no SOB, cough, hemoptysis, sputum or pleuritic pain  GI:  no abdominal pain, nausea, vomiting, constipation,, melanotic stools, BRBPR, or hematemesis; diarrhea chronic resolved  : normal with no hematuria, dysuria  BREAST: normal with no mass, discharge, pain  SKIN: normal with no rash, erythema, bruising, or swelling    Allergies:  Review of patient's allergies indicates:   Allergen Reactions    Adhesive tape-silicones Rash       Medications:    Current Outpatient Medications:     aspirin 81 MG Chew, Take 81 mg by mouth once daily., Disp: , Rfl:     CALCIUM CARBONATE (CALCIUM 500 ORAL), Take 500 mg by mouth once daily., Disp: , Rfl:     LACTOBACILLUS RHAMNOSUS GG (CULTURELLE ORAL), Take by mouth., Disp: , Rfl:     losartan (COZAAR) 100 MG tablet, Take 100 mg by mouth once daily., Disp: , Rfl:     metoprolol tartrate (LOPRESSOR) 25 MG tablet, Take 25 mg by mouth 2 (two) times daily., Disp: , Rfl:     multivitamin (ONE DAILY MULTIVITAMIN) per tablet, Take 1 tablet by mouth once daily., Disp: , Rfl:     simvastatin (ZOCOR) 10 MG  tablet, Take 10 mg by mouth every evening., Disp: , Rfl:     turmeric root extract 500 mg Cap, Take by mouth., Disp: , Rfl:     krill oil 500 mg Cap, Take by mouth., Disp: , Rfl:     PMHx/PSHx Updates:  See patient's last visit with me on 6/18/2018  See H&P on Vol #1            Pathology:  See Vol #1          Objective:     Vitals:  Blood pressure (!) 145/84, pulse 85, temperature 98.3 °F (36.8 °C), resp. rate 20, weight 66.9 kg (147 lb 6.4 oz).    Physical Examination:   GEN: no apparent distress, comfortable; AAOx3  HEAD: atraumatic and normocephalic  EYES: no pallor, no icterus, PERRLA  ENT: OMM, no pharyngeal erythema, external ears WNL; no nasal discharge; no thrush  NECK: no masses, thyroid normal, trachea midline, no LAD/LN's, supple  CV: RRR with no murmur; normal pulse; normal S1 and S2; no pedal edema  CHEST: Normal respiratory effort; CTAB; normal breath sounds; no wheeze or crackles  ABDOM: nontender and nondistended; soft; normal bowel sounds; no rebound/guarding  MUSC/Skeletal: ROM normal; no crepitus; joints normal; no deformities or arthropathy  EXTREM: no clubbing, cyanosis, inflammation or swelling  SKIN: no rashes, lesions, ulcers, petechiae or subcutaneous nodules  : no turner  NEURO: grossly intact; motor/sensory WNL; AAOx3; no tremors  PSYCH: normal mood, affect and behavior  LYMPH: normal cervical, supraclavicular, axillary and groin LN's  BREAST: no changes          Labs:     11/1/2018  Lab Results   Component Value Date    WBC 4.2 11/01/2018    HGB 13.1 11/01/2018    HCT 39.1 11/01/2018    MCV 92.4 11/01/2018     11/01/2018     BMP  Lab Results   Component Value Date     11/01/2018    K 4.0 11/01/2018     11/01/2018    CO2 31 11/01/2018    BUN 16 11/01/2018    CREATININE 0.67 11/01/2018    CALCIUM 9.3 11/01/2018    ESTGFRAFRICA 102 11/01/2018    EGFRNONAA 88 11/01/2018     Lab Results   Component Value Date    ALT 17 11/01/2018    AST 18 11/01/2018    ALKPHOS 60  11/01/2018    BILITOT 0.8 11/01/2018           Radiology/Diagnostic Studies:        Mammogram  10/30/2018:  IMPRESSION:    Changes related to previous lumpectomy and subsequent radiation therapy  posteriorly at the 12 o'clock position within the right breast.    No mammographic evidence of malignancy.      BI-RADS CATEGORY 2: BENIGN FINDING.      PET scan:   1/11/2018    IMPRESSION:    Negative for metastatic disease or residual/recurrent malignancy.          [facility] Unknown Rad Eap    Result Date: 10/30/2017  CMS MANDATED QUALITY DATA - MAMMOGRAPHY - [facility] utilizes a reminder system to ensure that all patients receive reminder letters and/or direct phone calls for appointments. ?This includes ?reminders for routine screening mammograms, diagnostic mammograms, and other breast imaging interventions when appropriate. ?This patient will be placed in the?appropriate reminder system. BILATERAL DIGITAL DIAGNOSTIC MAMMOGRAM with CAD with tomosynthesis CLINICAL INFORMATION: Short-term follow-up following lumpectomy for right breast carcinoma. Technologist: Ina Vega. FINDINGS: Comparison made with prior mammograms dating back to 08/13/2014. The breasts are heterogeneously dense, which lowers the sensitivity of mammography. There is scarring in the right breast at 12 o'clock position from previous lumpectomy. In the rest of the breast, there are no other areas of architectural distortion and there are no masses or suspicious grouped calcifications.     IMPRESSION: There is no evidence of malignancy. Recommend routine screening. BI-RADS CATEGORY 1: NEGATIVE. Read and electronically signed by: Jonah Gu MD on 10/30/2017 9:29 AM CDT JONAH GU MD      I have reviewed all available lab results and radiology reports.    Assessment/Plan:     (1) 72 y.o. female with diagnosis of right breast cancer  - stage IIIA   Triple neg  - s/p right lumpectomy in Oct 2015 by Dr Cadena with 4 out of 13  LN's that were positive  - s/p AC, taxol and XRT  - mammo 10/30/2018 was negative  - PET/CT 1/11/2018 is negative        (2) HTN - followed by Dr Topete; BP good currently    (3) chronic diarrhea issues - seen by Dr Miles - resolved - due to the krill oil       1. Malignant neoplasm of upper-outer quadrant of right breast in female, estrogen receptor negative     2. Estrogen receptor negative tumor status     3. Anemia due to chemotherapy             PLAN:  1. Check up to date labs in 4 months  2. F/u with PCP, GYN, Gen Surg etc  3. RTC in 4 months  4. mammo next Oct 2019  5. See if we can get repeat PET in Jan 2019   Fax note to  Manpreet Topete MD, LENORE Packer, Jd and Tammi    Discussion:     I have explained all of the above in detail and the patient understands all of the current recommendation(s). I have answered all of their questions to the best of my ability and to their complete satisfaction.   The patient is to continue with the current management plan.            Electronically signed by Abel Calle MD       n/a

## 2018-11-06 ENCOUNTER — APPOINTMENT (OUTPATIENT)
Dept: CARDIOTHORACIC SURGERY | Facility: CLINIC | Age: 83
End: 2018-11-06

## 2019-06-19 NOTE — ED ADULT NURSE NOTE - CAS EDP DISCH DISPOSITION ADMI
Telemetry negative Affect and characteristics of appearance, verbalizations, behaviors are appropriate

## 2020-01-10 NOTE — PHYSICAL THERAPY INITIAL EVALUATION ADULT - STANDING BALANCE: STATIC
Benjamin Pica Add [R11 2] Nausea and vomiting       ED Disposition     ED Disposition Condition Date/Time Comment    Discharge Stable Fri Vitaliy 10, 2020  7:44 AM Misty Cottrell discharge to home/self care  Follow-up Information     Follow up With Specialties Details Why [address] Katelin Fuentes MD Otolaryngology Call   [phone] [facility]      [facility] Fuchs [facility] Call   [facility] [phone]      [facility] Emergency Department Emergency Medicine  If symptoms worsen [id number] [facility] [id number] [phone]          Francesca Winter DO  01/10/20 7229 fair plus

## 2020-02-12 NOTE — CHART NOTE - NSCHARTNOTEFT_GEN_A_CORE
Left chest tube with minimal drainage and no pneumothorax on cxr.  Chest tube removed with tie-down without difficulty. CXR ordered.
Principal Discharge DX:	Pain crisis

## 2020-08-04 NOTE — PATIENT PROFILE ADULT. - FUNCTIONAL SCREEN CURRENT LEVEL: COMMUNICATION, MLM
Occupational Therapy Treatment Note     Date: 8/3/2020  Name: Torito Harris  [facility] Number: [id number]    Therapy Diagnosis:   Encounter Diagnoses   Name Primary?    Weakness of right upper extremity Yes    Impaired mobility and activities of daily living      Physician: Joe Oswald PA    Physician Orders: Evaluate and treat   Medical Diagnosis: Late effect of stroke  Date of Onset: 12/6/2017  Evaluation Date: 7/6/2020  Insurance Authorization Period Expiration: 12/31/2020  Plan of Care Certification Period: 7/6/2020 to 9/9/2020    Visit # / Visits authorized: 3 / 20  Time In: 1530  Time Out: 1615  Total Billable Time: 45 minutes    Precautions:  Standard      Subjective     Pt reports: Trying to use his R hand as much as he can.  Response to previous treatment: Pt wants to have more therapy than once a week.  Functional change: none noted    Involved Side: Right  Dominant Side: Right    Pain: 0/10 on current   Functional Pain Scale Rating 0-10:   0/10 on average  n/a/10 at best  4/10 at worst  Location: R shoulder during PROM  Description: Aching    Objective     Pt arrived to session, ambulated with SBQC and was taken to sink for bilateral activity of washing hands prior to session. Pt then ambulated to gym area and was seated at EOM.     Torito participated in dynamic functional therapeutic activities to improve functional performance for 45  minutes, including:  - Mobilization of palm of R hand for stretching and metacarpal flexion and extension    - General wrist stretches including 1. Scaphoid on radius 2. Increasing mobility of metacarpals 3. Carpal rolls 4. Increasing mobility towards radial deviation 5. Increasing mobility of wrist towards extension 6. Increasing mobility of wrist towards supination/pronation  - R wrist assisted to end ranges for flexion and extension with no pain noted.  - Assisted elbow flexion and extension with long stretch at end ranges  - Scapular mobilization for  "elevation, depression, adduction and protraction where AAROM was performed to R side.  - Assisted shoulder flexion from sitting at EOM combined with reaching for targets in various planes  Pt was assisted to supine and was placed with rolled towels at his waist and spine in an inverted "T" to promote thoracic extension.   From supine:   - Assisted shoulder flexion combined with scapular mobilization but shoulder flexion improve to WFL  - AAROM to flexion and abduction while assist from therapist to keep elbow in extension.   - With R UE extended toward ceiling, Pt was instructed to move his R UE in directions as directed by therapist.  Pt returned to sitting at EOM with Mod A and then ambulated to counter for standing activities.  - Simple assisted grasp and release of large pipe sleeves for placement on a post but his needed assist for control and for wrist extension for task.     Home Exercises and Education Provided     Education provided:   - Use right hand as much as possible with functional daily tasks   - Progress towards goals     Assessment   oTrito Harris is a 58 y.o. male referred to outpatient occupational therapy and presents with a medical diagnosis of L CVA, resulting in decreased flexibility, decreased range of motion, decreased muscle strength, impaired function and decreased work ability. He is motivated to improve and wants to attend more sessions but is limited to 1 x per week.  Pt would continue to benefit from skilled occupational therapy services to maximize pain free and functional use of right UE and hand for increased performance with meaningful occupations.     Torito is progressing well towards his goals and there are no updates to goals at this time. Pt prognosis is Good.     Pt will continue to benefit from skilled outpatient occupational therapy to address the deficits listed in the problem list on initial evaluation provide pt/family education and to maximize pt's level of " independence in the home and community environment.     Anticipated barriers to occupational therapy: None noted    Pt's spiritual, cultural and educational needs considered and pt agreeable to plan of care and goals.    Goals:  Short Term Goals: 3 weeks   ROM/Strength to perform the ADL's and functional activities listed below:   - Pt will improve functional  strength needed for tasks to 25# in R hand. ongoing  - Pt will improve AROM for shoulder flexion needed for functional tasks to 155* in R UE. ongoing  - Simple meal prep will improve to Min A ongoing  - Pt will be Independent with HEP to improve ROM and FM skills. ongoing    Long Term Goals: 8 weeks   ROM/Strength to perform the ADL's and functional activities listed below:   - Pt will improve functional  strength needed for tasks to 30# in R hand. ongoing  - Pt will improve AROM for shoulder flexion needed for functional tasks to 165* in R UE. ongoing  - Simple meal prep will improve to Mod I ongoing  - Pt will complete clinical assessment for skills needed for the IADL of driving. ongoing  - G code self care CJ (discontinued)     Plan   Certification Period/Plan of care expiration: 7/6/2020 to 9/9/2020.     Outpatient Occupational Therapy 1 times weekly for 8 weeks to include the following interventions: Patient Education, Self Care, Therapeutic Activities and Therapeutic Exercise.      TIAGO Carlisle        (0) understands/communicates without difficulty

## 2021-08-12 NOTE — ED ADULT TRIAGE NOTE - ESI TRIAGE ACUITY LEVEL, MLM
[de-identified] : 8/12/21\par Sinus  Rhythm  -First degree A-V block \par Shantell = 226\par -Left atrial enlargement. \par  -  Negative precordial T-waves  -May be normal -consider anteroseptal ischemia. \par \par BORDERLINE 3

## 2022-09-06 NOTE — PROGRESS NOTE ADULT - REASON FOR ADMISSION
RN remains at bedside. Pt appears comfortable, denies pain, discomfort. Conversing with family.      Randa Ledesma RN  09/05/22 6711 Chest Pain, Rule Out ACS

## 2023-01-01 NOTE — PATIENT PROFILE ADULT. - CAREGIVER
You can access the FollowMyHealth Patient Portal offered by [facility] by registering at the following website: [email or web address]. By joining [facility]’s FollowMyHealth portal, you will also be able to view your health information using other applications (apps) compatible with our system.
No

## 2023-06-05 NOTE — PATIENT PROFILE ADULT. - PROVIDER NOTIFICATION
Declines Consent (Spinal Accessory)/Introductory Paragraph: The rationale for Mohs was explained to the patient and consent was obtained. The risks, benefits and alternatives to therapy were discussed in detail. Specifically, the risks of damage to the spinal accessory nerve, infection, scarring, bleeding, prolonged wound healing, incomplete removal, allergy to anesthesia, and recurrence were addressed. Prior to the procedure, the treatment site was clearly identified and confirmed by the patient. All components of Universal Protocol/PAUSE Rule completed.

## 2024-06-10 NOTE — PROGRESS NOTE ADULT - PROBLEM SELECTOR PLAN 1
Pt with c/o chest pain x 4 days. C/o midsternal chest pain today s/p 0.4 mg SL nitro after which patient became hypotensive to 60's, bradycardic s/p 1 mg Atropine and 500 ml NS with improvement in symtpoms  - Trops negative x 2, EKG with incomplete RBBB  - Plan for cath today with   - Cont ASA/Plavix, Lipitor. Resume Labetolol. None

## 2024-08-12 NOTE — H&P ADULT - NEUROLOGICAL COMMENTS
Problem: At Risk for Falls  Goal: Patient does not fall  Outcome: Monitoring/Evaluating progress     Problem: Pain  Goal: Acceptable pain level achieved/maintained at rest using appropriate pain scale for the patient  Outcome: Monitoring/Evaluating progress     Problem: Impaired Physical Mobility  Goal: Functional status is maintained or returned to baseline during hospitalization  Outcome: Monitoring/Evaluating progress     Problem: Skin Integrity Alteration  Goal: Skin remains intact with no new/deterioration of wound or pressure injury  Outcome: Monitoring/Evaluating progress      dizziness
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
